# Patient Record
Sex: FEMALE | Race: BLACK OR AFRICAN AMERICAN | NOT HISPANIC OR LATINO | Employment: OTHER | RURAL
[De-identification: names, ages, dates, MRNs, and addresses within clinical notes are randomized per-mention and may not be internally consistent; named-entity substitution may affect disease eponyms.]

---

## 2022-01-14 VITALS — HEIGHT: 65 IN | WEIGHT: 205 LBS | BODY MASS INDEX: 34.16 KG/M2

## 2022-01-14 RX ORDER — ALBUTEROL SULFATE 90 UG/1
2 AEROSOL, METERED RESPIRATORY (INHALATION) EVERY 4 HOURS PRN
COMMUNITY
End: 2023-07-19

## 2022-01-14 RX ORDER — AMLODIPINE BESYLATE 2.5 MG/1
2.5 TABLET ORAL DAILY
COMMUNITY
End: 2023-07-19 | Stop reason: SDUPTHER

## 2022-01-14 RX ORDER — ATORVASTATIN CALCIUM 40 MG/1
40 TABLET, FILM COATED ORAL DAILY
COMMUNITY
End: 2023-07-19 | Stop reason: SDUPTHER

## 2022-01-14 RX ORDER — IBUPROFEN 600 MG/1
600 TABLET ORAL 3 TIMES DAILY PRN
COMMUNITY
End: 2023-07-19 | Stop reason: SDUPTHER

## 2022-01-14 RX ORDER — GABAPENTIN 100 MG/1
100 CAPSULE ORAL 3 TIMES DAILY
COMMUNITY
End: 2023-02-07

## 2022-01-14 RX ORDER — CHLORPHENIRAMINE MALEATE AND PHENYLEPHRINE HYDROCHLORIDE 4; 10 MG/1; MG/1
1 TABLET, COATED ORAL
COMMUNITY
End: 2023-02-07

## 2022-01-14 RX ORDER — BUDESONIDE AND FORMOTEROL FUMARATE DIHYDRATE 80; 4.5 UG/1; UG/1
2 AEROSOL RESPIRATORY (INHALATION)
COMMUNITY
End: 2023-04-20

## 2022-01-14 RX ORDER — CODEINE PHOSPHATE AND GUAIFENESIN 10; 100 MG/5ML; MG/5ML
10 SOLUTION ORAL EVERY 8 HOURS PRN
COMMUNITY
End: 2023-02-07

## 2022-01-14 RX ORDER — AMITRIPTYLINE HYDROCHLORIDE 75 MG/1
150 TABLET ORAL NIGHTLY
COMMUNITY
End: 2023-07-19 | Stop reason: SDUPTHER

## 2022-01-14 RX ORDER — ATENOLOL AND CHLORTHALIDONE TABLET 100; 25 MG/1; MG/1
1 TABLET ORAL DAILY
COMMUNITY
End: 2023-07-19 | Stop reason: SDUPTHER

## 2022-01-14 RX ORDER — FLUTICASONE PROPIONATE 50 MCG
2 SPRAY, SUSPENSION (ML) NASAL DAILY
COMMUNITY
End: 2023-07-19

## 2022-01-14 RX ORDER — DOCUSATE SODIUM 100 MG/1
100 CAPSULE, LIQUID FILLED ORAL 2 TIMES DAILY
COMMUNITY
End: 2023-07-19 | Stop reason: SDUPTHER

## 2022-01-14 RX ORDER — GLIPIZIDE 2.5 MG/1
2.5 TABLET, EXTENDED RELEASE ORAL
COMMUNITY
End: 2023-07-19 | Stop reason: SDUPTHER

## 2022-01-14 RX ORDER — METFORMIN HYDROCHLORIDE 1000 MG/1
1000 TABLET ORAL 2 TIMES DAILY WITH MEALS
COMMUNITY
End: 2023-07-19 | Stop reason: SDUPTHER

## 2022-02-22 ENCOUNTER — OFFICE VISIT (OUTPATIENT)
Dept: PULMONOLOGY | Facility: CLINIC | Age: 62
End: 2022-02-22
Payer: COMMERCIAL

## 2022-02-22 VITALS
HEIGHT: 65 IN | HEART RATE: 86 BPM | BODY MASS INDEX: 34.16 KG/M2 | WEIGHT: 205 LBS | SYSTOLIC BLOOD PRESSURE: 140 MMHG | DIASTOLIC BLOOD PRESSURE: 90 MMHG | OXYGEN SATURATION: 96 %

## 2022-02-22 DIAGNOSIS — R59.0 MEDIASTINAL ADENOPATHY: Primary | ICD-10-CM

## 2022-02-22 DIAGNOSIS — R59.0 MEDIASTINAL ADENOPATHY: ICD-10-CM

## 2022-02-22 DIAGNOSIS — F17.210 CIGARETTE NICOTINE DEPENDENCE WITHOUT COMPLICATION: Chronic | ICD-10-CM

## 2022-02-22 DIAGNOSIS — R05.9 COUGH: Primary | Chronic | ICD-10-CM

## 2022-02-22 DIAGNOSIS — J30.9 ALLERGIC SINUSITIS: Chronic | ICD-10-CM

## 2022-02-22 PROCEDURE — 99204 PR OFFICE/OUTPT VISIT, NEW, LEVL IV, 45-59 MIN: ICD-10-PCS | Mod: S$PBB,,, | Performed by: INTERNAL MEDICINE

## 2022-02-22 PROCEDURE — 3080F DIAST BP >= 90 MM HG: CPT | Mod: CPTII,,, | Performed by: INTERNAL MEDICINE

## 2022-02-22 PROCEDURE — 1159F MED LIST DOCD IN RCRD: CPT | Mod: CPTII,,, | Performed by: INTERNAL MEDICINE

## 2022-02-22 PROCEDURE — 1160F RVW MEDS BY RX/DR IN RCRD: CPT | Mod: CPTII,,, | Performed by: INTERNAL MEDICINE

## 2022-02-22 PROCEDURE — 3008F BODY MASS INDEX DOCD: CPT | Mod: CPTII,,, | Performed by: INTERNAL MEDICINE

## 2022-02-22 PROCEDURE — 99204 OFFICE O/P NEW MOD 45 MIN: CPT | Mod: S$PBB,,, | Performed by: INTERNAL MEDICINE

## 2022-02-22 PROCEDURE — 3077F PR MOST RECENT SYSTOLIC BLOOD PRESSURE >= 140 MM HG: ICD-10-PCS | Mod: CPTII,,, | Performed by: INTERNAL MEDICINE

## 2022-02-22 PROCEDURE — 99215 OFFICE O/P EST HI 40 MIN: CPT | Mod: PBBFAC | Performed by: INTERNAL MEDICINE

## 2022-02-22 PROCEDURE — 3008F PR BODY MASS INDEX (BMI) DOCUMENTED: ICD-10-PCS | Mod: CPTII,,, | Performed by: INTERNAL MEDICINE

## 2022-02-22 PROCEDURE — 3077F SYST BP >= 140 MM HG: CPT | Mod: CPTII,,, | Performed by: INTERNAL MEDICINE

## 2022-02-22 PROCEDURE — 3080F PR MOST RECENT DIASTOLIC BLOOD PRESSURE >= 90 MM HG: ICD-10-PCS | Mod: CPTII,,, | Performed by: INTERNAL MEDICINE

## 2022-02-22 PROCEDURE — 1159F PR MEDICATION LIST DOCUMENTED IN MEDICAL RECORD: ICD-10-PCS | Mod: CPTII,,, | Performed by: INTERNAL MEDICINE

## 2022-02-22 PROCEDURE — 1160F PR REVIEW ALL MEDS BY PRESCRIBER/CLIN PHARMACIST DOCUMENTED: ICD-10-PCS | Mod: CPTII,,, | Performed by: INTERNAL MEDICINE

## 2022-02-22 RX ORDER — MINERAL OIL
180 ENEMA (ML) RECTAL DAILY
Qty: 90 TABLET | Refills: 3 | Status: SHIPPED | OUTPATIENT
Start: 2022-02-22 | End: 2023-02-07

## 2022-02-22 NOTE — H&P (VIEW-ONLY)
Subjective:       Patient ID: Genia Madison is a 61 y.o. female.    Chief Complaint: Referral (LIZET,Cough, and wheezing RF MIKE Vasquez )    61-year-old female history of diabetes, hypertension, hyperlipidemia.  She is referred to me for cough.  She states that due to weather changes she has significant sinus congestion and drainage that exacerbates her cough.  She uses an over-the-counter antihistamine.  She is a current smoker half a pack per day and she has done so since she was a teenager, greater than 30 pack years.  She denies any recent weight loss or night sweats.  Family history significant for lung cancer in her sister and GI cancer in her father.  She denies any symptoms of shortness of breath.  She has Symbicort listed in her medications but states she does not take this regularly.  She has a CT report and images with her which show mediastinal adenopathy specifically, 4 L, 7, and right hilar adenopathy.    Past Medical History:   Diagnosis Date    Abnormal chest CT     1/13/22 [facility]    Cough     bronchitis 11/2021    Diabetes mellitus, type 2     GERD (gastroesophageal reflux disease)     Hyperlipidemia     Hypertension     Hypoxia     O2  2L @ HS    Migraine     LIZET (obstructive sleep apnea)     Seizures     Wheeze      Past Surgical History:   Procedure Laterality Date    BRAIN SURGERY      1/1/2001: [facility]     Family History   Problem Relation Age of Onset    No Known Problems Mother     No Known Problems Father      Review of patient's allergies indicates:   Allergen Reactions    Lisinopril     Pcn [penicillins]       Social History     Tobacco Use    Smoking status: Current Every Day Smoker     Packs/day: 0.50     Years: 30.00     Pack years: 15.00    Smokeless tobacco: Never Used    Tobacco comment: start age 26   Substance Use Topics    Alcohol use: Not Currently    Drug use: Not Currently      Review of Systems   Constitutional: Negative for fever,  chills, activity change and appetite change.   HENT: Positive for postnasal drip, sinus pressure and congestion. Negative for rhinorrhea, sore throat and voice change.    Respiratory: Positive for cough and use of rescue inhaler. Negative for apnea, hemoptysis, sputum production, chest tightness, shortness of breath, wheezing, orthopnea and dyspnea on extertion.    Cardiovascular: Negative for chest pain, palpitations and leg swelling.   Musculoskeletal: Negative for arthralgias, back pain, joint swelling and myalgias.   Skin: Negative for rash.   Gastrointestinal: Negative for nausea, vomiting, abdominal pain and acid reflux.   Neurological: Negative for syncope, weakness, light-headedness and headaches.   Hematological: Negative for adenopathy. Does not bruise/bleed easily and no excessive bruising.   Psychiatric/Behavioral: Negative for confusion and sleep disturbance. The patient is not nervous/anxious.        Objective:      Physical Exam   Constitutional: She is oriented to person, place, and time. She appears well-developed and well-nourished. No distress. She is obese.   HENT:   Head: Normocephalic.   Nose: Nose normal.   Neck: No JVD present. No tracheal deviation present. No thyromegaly present.   Right sided neck scar   Cardiovascular: Normal rate, regular rhythm and normal heart sounds.   Pulmonary/Chest: Effort normal and breath sounds normal. No respiratory distress. She has no wheezes. She has no rhonchi. She has no rales.   Abdominal: Soft. Bowel sounds are normal. There is no abdominal tenderness.   Musculoskeletal:         General: No deformity or edema.      Cervical back: Neck supple.   Lymphadenopathy: No supraclavicular adenopathy is present.     She has no cervical adenopathy.   Neurological: She is alert and oriented to person, place, and time. No cranial nerve deficit.   Skin: Skin is warm and dry. No rash noted.   Psychiatric: She has a normal mood and affect. Her behavior is normal.    Vitals reviewed.    Personal Diagnostic Review  CT of chest: left lower lobe density, mediastinal and right hilar adenopathy    No flowsheet data found.      Assessment:       1. Cough    2. Allergic sinusitis    3. Cigarette nicotine dependence without complication    4. Mediastinal adenopathy        Outpatient Encounter Medications as of 2/22/2022   Medication Sig Dispense Refill    albuterol (PROVENTIL/VENTOLIN HFA) 90 mcg/actuation inhaler Inhale 2 puffs into the lungs every 4 (four) hours as needed for Wheezing. Rescue      amitriptyline (ELAVIL) 75 MG tablet Take 150 mg by mouth every evening. 75 mg: tab 2 at HS (150 mg @ HS)      amLODIPine (NORVASC) 2.5 MG tablet Take 2.5 mg by mouth once daily.      aspirin (ASPIR-81 ORAL) Take 1 tablet by mouth once daily.      atenoloL-chlorthalidone (TENORETIC) 100-25 mg per tablet Take 1 tablet by mouth once daily.      atorvastatin (LIPITOR) 40 MG tablet Take 40 mg by mouth once daily.      blood sugar diagnostic (ONE TOUCH TEST MISC) 1 strip by Misc.(Non-Drug; Combo Route) route Daily. One Touch Ultra : Daily and PRN      budesonide-formoterol 80-4.5 mcg (SYMBICORT) 80-4.5 mcg/actuation HFAA Inhale 2 puffs into the lungs. Controller      chlorpheniramine-phenylephrine (ED A-HIST) 4-10 mg per tablet Take 1 tablet by mouth every 4 to 6 hours as needed for Congestion.      docusate sodium (COLACE) 100 MG capsule Take 100 mg by mouth 2 (two) times daily. Listed as 2 caps daily      esomeprazole magnesium (NEXIUM 24HR ORAL) Take by mouth.      fluticasone propionate (FLONASE) 50 mcg/actuation nasal spray 2 sprays by Each Nostril route once daily.      gabapentin (NEURONTIN) 100 MG capsule Take 100 mg by mouth 3 (three) times daily.      glipiZIDE (GLUCOTROL) 2.5 MG TR24 Take 2.5 mg by mouth daily with breakfast.      guaiFENesin-codeine 100-10 mg/5 ml (TUSSI-ORGANIDIN NR)  mg/5 mL syrup Take 10 mLs by mouth every 8 (eight) hours as needed for Cough.       ibuprofen (ADVIL,MOTRIN) 600 MG tablet Take 600 mg by mouth 3 (three) times daily as needed for Pain.      metFORMIN (GLUCOPHAGE) 1000 MG tablet Take 1,000 mg by mouth 2 (two) times daily with meals.      RELION THIN LANCETS MISC 1 lancet by Misc.(Non-Drug; Combo Route) route once daily. Daily and PRN      fexofenadine (ALLEGRA) 180 MG tablet Take 1 tablet (180 mg total) by mouth once daily. 90 tablet 3     No facility-administered encounter medications on file as of 2/22/2022.     No orders of the defined types were placed in this encounter.      Plan:       Cough  - secondary to post-nasal drip  - recommend daily allegra and flonase    Mediastinal adenopathy  - incidental finding, smoker, + family history of lung cancer  - recommend sampling with EBUS  - LLL abnormality, BAL  - send for flow cytometry

## 2022-02-22 NOTE — PATIENT INSTRUCTIONS
Check in on 1st floor of Ambulatory Care building in the GI/SPECIAL PROCEDURES area.     Pre Procedure Testing  If you have not had Covid Vaccination, you will need to be tested 2-3 days prior to the procedure. If testing is done at facility outside of [facility], please bring the results with you.   If you have not had the Vaccine and need our office to order the test, please contact Dr Peng's nurse in advance so that it can be set up.     Dont eat or drink  Follow any directions you are given for not eating or drinking before surgery. If you don't follow instructions about when to stop eating and drinking, your procedure may be postponed or rescheduled for another day. This is a safety issue.  You can brush your teeth and rinse your mouth, but dont swallow any water.    Day of surgery  Leave jewelry (including rings), watches, and other valuables at home.  Be sure to bring health insurance cards or forms, a photo ID and copy of Covid Vaccine card.  Bring a list of your medicines (include the name, dose, how often you take them, and the time last dose was taken).  You will be sedated for the procedure so you must have a  present with you.  Arrive on time at the hospital or surgery facility.        Flexible Bronchoscopy  A flexible bronchoscopy is an exam of the airways of your lungs. A thin, flexible tube called a bronchoscope is used. It has a light and small camera that allow the healthcare provider to view your airways.    Before your test  Follow your healthcare provider's instructions carefully. If you dont, the exam may be canceled. Or you may need to take it again.  If you are taking blood-thinning medicine, ask your healthcare provider if you should stop taking the medicine before this test.  Have no food or drink for at least 8 hours before the test. Also, avoid smoking for 24 hours before the test.  You will need to remove any dentures or removable devices from your mouth.  Right  before the test, you will be given sedating medicines to help you relax. The medicine may be given by an IV (intravenously) into one of your veins. In addition, your nose and throat may be numbed with a special spray to help prevent gagging and coughing.  If you are having this test as an outpatient, make sure you have an adult friend or family member to drive you home.  During your test  Bronchoscopy takes 45 to 60 minutes and includes the following steps:  You may be given medicine (anesthesia) so that you are unconscious or asleep during the procedure.  The healthcare provider inserts the tube into your nose or mouth.  If you have not been given anesthesia, you might feel a gagging sensation. To help ease this feeling, you will be told to swallow or take deep breaths. Your airway will remain open even with the tube in place. But you wont be able to talk.  The provider checks your breathing passage. He or she may also remove tiny tissue samples for biopsy.  After your test  You may have a mild sore throat or cough. Your voice may also be hoarse.  Don't eat or drink until the anesthesia wears off.  If you had a biopsy, you might see traces of blood being coughed up.  When to call your healthcare provider  Call your healthcare provider right away if you have any of the following:  Shortness of breath  Chest pain  Bleeding from your nose or throat  Coughing up a large amount of blood  A fever above 100.4°F (38°C) for more than 24 hours  Call 911  Call 911 if you have:  Chest pain  Severe shortness of breath   Date Last Reviewed: 12/1/2016 © 2000-2017 [employer]. [address] [place] [address], [place] [address]. All rights reserved. This information is not intended as a substitute for professional medical care. Always follow your healthcare professional's instructions.

## 2022-02-22 NOTE — PROGRESS NOTES
Subjective:       Patient ID: Genia Madison is a 61 y.o. female.    Chief Complaint: Referral (LIZET,Cough, and wheezing RF MIKE Vasquez )    61-year-old female history of diabetes, hypertension, hyperlipidemia.  She is referred to me for cough.  She states that due to weather changes she has significant sinus congestion and drainage that exacerbates her cough.  She uses an over-the-counter antihistamine.  She is a current smoker half a pack per day and she has done so since she was a teenager, greater than 30 pack years.  She denies any recent weight loss or night sweats.  Family history significant for lung cancer in her sister and GI cancer in her father.  She denies any symptoms of shortness of breath.  She has Symbicort listed in her medications but states she does not take this regularly.  She has a CT report and images with her which show mediastinal adenopathy specifically, 4 L, 7, and right hilar adenopathy.    Past Medical History:   Diagnosis Date    Abnormal chest CT     1/13/22 [facility]    Cough     bronchitis 11/2021    Diabetes mellitus, type 2     GERD (gastroesophageal reflux disease)     Hyperlipidemia     Hypertension     Hypoxia     O2  2L @ HS    Migraine     LIZET (obstructive sleep apnea)     Seizures     Wheeze      Past Surgical History:   Procedure Laterality Date    BRAIN SURGERY      1/1/2001: [facility]     Family History   Problem Relation Age of Onset    No Known Problems Mother     No Known Problems Father      Review of patient's allergies indicates:   Allergen Reactions    Lisinopril     Pcn [penicillins]       Social History     Tobacco Use    Smoking status: Current Every Day Smoker     Packs/day: 0.50     Years: 30.00     Pack years: 15.00    Smokeless tobacco: Never Used    Tobacco comment: start age 26   Substance Use Topics    Alcohol use: Not Currently    Drug use: Not Currently      Review of Systems   Constitutional: Negative for fever,  chills, activity change and appetite change.   HENT: Positive for postnasal drip, sinus pressure and congestion. Negative for rhinorrhea, sore throat and voice change.    Respiratory: Positive for cough and use of rescue inhaler. Negative for apnea, hemoptysis, sputum production, chest tightness, shortness of breath, wheezing, orthopnea and dyspnea on extertion.    Cardiovascular: Negative for chest pain, palpitations and leg swelling.   Musculoskeletal: Negative for arthralgias, back pain, joint swelling and myalgias.   Skin: Negative for rash.   Gastrointestinal: Negative for nausea, vomiting, abdominal pain and acid reflux.   Neurological: Negative for syncope, weakness, light-headedness and headaches.   Hematological: Negative for adenopathy. Does not bruise/bleed easily and no excessive bruising.   Psychiatric/Behavioral: Negative for confusion and sleep disturbance. The patient is not nervous/anxious.        Objective:      Physical Exam   Constitutional: She is oriented to person, place, and time. She appears well-developed and well-nourished. No distress. She is obese.   HENT:   Head: Normocephalic.   Nose: Nose normal.   Neck: No JVD present. No tracheal deviation present. No thyromegaly present.   Right sided neck scar   Cardiovascular: Normal rate, regular rhythm and normal heart sounds.   Pulmonary/Chest: Effort normal and breath sounds normal. No respiratory distress. She has no wheezes. She has no rhonchi. She has no rales.   Abdominal: Soft. Bowel sounds are normal. There is no abdominal tenderness.   Musculoskeletal:         General: No deformity or edema.      Cervical back: Neck supple.   Lymphadenopathy: No supraclavicular adenopathy is present.     She has no cervical adenopathy.   Neurological: She is alert and oriented to person, place, and time. No cranial nerve deficit.   Skin: Skin is warm and dry. No rash noted.   Psychiatric: She has a normal mood and affect. Her behavior is normal.    Vitals reviewed.    Personal Diagnostic Review  CT of chest: left lower lobe density, mediastinal and right hilar adenopathy    No flowsheet data found.      Assessment:       1. Cough    2. Allergic sinusitis    3. Cigarette nicotine dependence without complication    4. Mediastinal adenopathy        Outpatient Encounter Medications as of 2/22/2022   Medication Sig Dispense Refill    albuterol (PROVENTIL/VENTOLIN HFA) 90 mcg/actuation inhaler Inhale 2 puffs into the lungs every 4 (four) hours as needed for Wheezing. Rescue      amitriptyline (ELAVIL) 75 MG tablet Take 150 mg by mouth every evening. 75 mg: tab 2 at HS (150 mg @ HS)      amLODIPine (NORVASC) 2.5 MG tablet Take 2.5 mg by mouth once daily.      aspirin (ASPIR-81 ORAL) Take 1 tablet by mouth once daily.      atenoloL-chlorthalidone (TENORETIC) 100-25 mg per tablet Take 1 tablet by mouth once daily.      atorvastatin (LIPITOR) 40 MG tablet Take 40 mg by mouth once daily.      blood sugar diagnostic (ONE TOUCH TEST MISC) 1 strip by Misc.(Non-Drug; Combo Route) route Daily. One Touch Ultra : Daily and PRN      budesonide-formoterol 80-4.5 mcg (SYMBICORT) 80-4.5 mcg/actuation HFAA Inhale 2 puffs into the lungs. Controller      chlorpheniramine-phenylephrine (ED A-HIST) 4-10 mg per tablet Take 1 tablet by mouth every 4 to 6 hours as needed for Congestion.      docusate sodium (COLACE) 100 MG capsule Take 100 mg by mouth 2 (two) times daily. Listed as 2 caps daily      esomeprazole magnesium (NEXIUM 24HR ORAL) Take by mouth.      fluticasone propionate (FLONASE) 50 mcg/actuation nasal spray 2 sprays by Each Nostril route once daily.      gabapentin (NEURONTIN) 100 MG capsule Take 100 mg by mouth 3 (three) times daily.      glipiZIDE (GLUCOTROL) 2.5 MG TR24 Take 2.5 mg by mouth daily with breakfast.      guaiFENesin-codeine 100-10 mg/5 ml (TUSSI-ORGANIDIN NR)  mg/5 mL syrup Take 10 mLs by mouth every 8 (eight) hours as needed for Cough.       ibuprofen (ADVIL,MOTRIN) 600 MG tablet Take 600 mg by mouth 3 (three) times daily as needed for Pain.      metFORMIN (GLUCOPHAGE) 1000 MG tablet Take 1,000 mg by mouth 2 (two) times daily with meals.      RELION THIN LANCETS MISC 1 lancet by Misc.(Non-Drug; Combo Route) route once daily. Daily and PRN      fexofenadine (ALLEGRA) 180 MG tablet Take 1 tablet (180 mg total) by mouth once daily. 90 tablet 3     No facility-administered encounter medications on file as of 2/22/2022.     No orders of the defined types were placed in this encounter.      Plan:       Cough  - secondary to post-nasal drip  - recommend daily allegra and flonase    Mediastinal adenopathy  - incidental finding, smoker, + family history of lung cancer  - recommend sampling with EBUS  - LLL abnormality, BAL  - send for flow cytometry

## 2022-02-25 ENCOUNTER — ANESTHESIA EVENT (OUTPATIENT)
Dept: GASTROENTEROLOGY | Facility: HOSPITAL | Age: 62
End: 2022-02-25
Payer: COMMERCIAL

## 2022-02-25 ENCOUNTER — HOSPITAL ENCOUNTER (OUTPATIENT)
Dept: GASTROENTEROLOGY | Facility: HOSPITAL | Age: 62
Discharge: HOME | End: 2022-02-25
Attending: INTERNAL MEDICINE
Payer: COMMERCIAL

## 2022-02-25 ENCOUNTER — ANESTHESIA (OUTPATIENT)
Dept: GASTROENTEROLOGY | Facility: HOSPITAL | Age: 62
End: 2022-02-25
Payer: COMMERCIAL

## 2022-02-25 VITALS
BODY MASS INDEX: 34.16 KG/M2 | OXYGEN SATURATION: 95 % | WEIGHT: 205 LBS | HEART RATE: 105 BPM | HEIGHT: 65 IN | TEMPERATURE: 98 F | RESPIRATION RATE: 12 BRPM | DIASTOLIC BLOOD PRESSURE: 77 MMHG | SYSTOLIC BLOOD PRESSURE: 117 MMHG

## 2022-02-25 DIAGNOSIS — R59.0 MEDIASTINAL ADENOPATHY: ICD-10-CM

## 2022-02-25 LAB — DIRECT PREP: NORMAL

## 2022-02-25 PROCEDURE — 87206 SMEAR FLUORESCENT/ACID STAI: CPT | Performed by: INTERNAL MEDICINE

## 2022-02-25 PROCEDURE — D9220A PRA ANESTHESIA: ICD-10-PCS | Mod: CRNA,,, | Performed by: NURSE ANESTHETIST, CERTIFIED REGISTERED

## 2022-02-25 PROCEDURE — 31653 PR BRONCH W/ EBUS, SAMPLING 3 OR MORE NODES, INCL GUIDE: ICD-10-PCS | Mod: ,,, | Performed by: INTERNAL MEDICINE

## 2022-02-25 PROCEDURE — 88305 TISSUE EXAM BY PATHOLOGIST: CPT | Mod: MCY | Performed by: INTERNAL MEDICINE

## 2022-02-25 PROCEDURE — C1819 TISSUE LOCALIZATION-EXCISION: HCPCS

## 2022-02-25 PROCEDURE — D9220A PRA ANESTHESIA: Mod: CRNA,,, | Performed by: NURSE ANESTHETIST, CERTIFIED REGISTERED

## 2022-02-25 PROCEDURE — 87015 SPECIMEN INFECT AGNT CONCNTJ: CPT | Performed by: INTERNAL MEDICINE

## 2022-02-25 PROCEDURE — 88185 FLOWCYTOMETRY/TC ADD-ON: CPT

## 2022-02-25 PROCEDURE — 31653 BRONCH EBUS SAMPLNG 3/> NODE: CPT | Mod: ,,, | Performed by: INTERNAL MEDICINE

## 2022-02-25 PROCEDURE — 25000003 PHARM REV CODE 250: Performed by: NURSE ANESTHETIST, CERTIFIED REGISTERED

## 2022-02-25 PROCEDURE — 31624 DX BRONCHOSCOPE/LAVAGE: CPT

## 2022-02-25 PROCEDURE — 88305 TISSUE EXAM BY PATHOLOGIST: CPT | Mod: 26,XU,, | Performed by: PATHOLOGY

## 2022-02-25 PROCEDURE — 27000177 HC AIRWAY, LARYNGEAL MASK: Performed by: ANESTHESIOLOGY

## 2022-02-25 PROCEDURE — 31653 BRONCH EBUS SAMPLNG 3/> NODE: CPT

## 2022-02-25 PROCEDURE — D9220A PRA ANESTHESIA: Mod: ANES,,, | Performed by: ANESTHESIOLOGY

## 2022-02-25 PROCEDURE — 63600175 PHARM REV CODE 636 W HCPCS: Performed by: NURSE ANESTHETIST, CERTIFIED REGISTERED

## 2022-02-25 PROCEDURE — 63600175 PHARM REV CODE 636 W HCPCS: Performed by: ANESTHESIOLOGY

## 2022-02-25 PROCEDURE — 88305 CYTOLOGY, FNA: ICD-10-PCS | Mod: 26,XU,, | Performed by: PATHOLOGY

## 2022-02-25 PROCEDURE — 87210 SMEAR WET MOUNT SALINE/INK: CPT | Performed by: INTERNAL MEDICINE

## 2022-02-25 PROCEDURE — 31652 BRONCH EBUS SAMPLNG 1/2 NODE: CPT

## 2022-02-25 PROCEDURE — 87102 FUNGUS ISOLATION CULTURE: CPT | Performed by: INTERNAL MEDICINE

## 2022-02-25 PROCEDURE — 88108 NON-GYN CYTOLOGY: ICD-10-PCS | Mod: 26,,, | Performed by: PATHOLOGY

## 2022-02-25 PROCEDURE — 37000008 HC ANESTHESIA 1ST 15 MINUTES

## 2022-02-25 PROCEDURE — 31624 PR BRONCHOSCOPY,DIAG2STIC W LAVAGE: ICD-10-PCS | Mod: ,,, | Performed by: INTERNAL MEDICINE

## 2022-02-25 PROCEDURE — 87070 CULTURE OTHR SPECIMN AEROBIC: CPT | Performed by: INTERNAL MEDICINE

## 2022-02-25 PROCEDURE — 88305 TISSUE EXAM BY PATHOLOGIST: CPT | Mod: 26,,, | Performed by: PATHOLOGY

## 2022-02-25 PROCEDURE — 37000009 HC ANESTHESIA EA ADD 15 MINS

## 2022-02-25 PROCEDURE — 88172 CYTP DX EVAL FNA 1ST EA SITE: CPT | Mod: 26,,, | Performed by: PATHOLOGY

## 2022-02-25 PROCEDURE — 27201423 OPTIME MED/SURG SUP & DEVICES STERILE SUPPLY

## 2022-02-25 PROCEDURE — 88172 CYTOLOGY, FNA: ICD-10-PCS | Mod: 26,,, | Performed by: PATHOLOGY

## 2022-02-25 PROCEDURE — 88184 FLOWCYTOMETRY/ TC 1 MARKER: CPT

## 2022-02-25 PROCEDURE — 31624 DX BRONCHOSCOPE/LAVAGE: CPT | Mod: ,,, | Performed by: INTERNAL MEDICINE

## 2022-02-25 PROCEDURE — 88108 CYTOPATH CONCENTRATE TECH: CPT | Mod: 26,,, | Performed by: PATHOLOGY

## 2022-02-25 PROCEDURE — 27000716 HC OXISENSOR PROBE, ANY SIZE: Performed by: ANESTHESIOLOGY

## 2022-02-25 PROCEDURE — D9220A PRA ANESTHESIA: ICD-10-PCS | Mod: ANES,,, | Performed by: ANESTHESIOLOGY

## 2022-02-25 PROCEDURE — 27000510 HC BLANKET BAIR HUGGER ANY SIZE: Performed by: ANESTHESIOLOGY

## 2022-02-25 RX ORDER — MEPERIDINE HYDROCHLORIDE 25 MG/ML
25 INJECTION INTRAMUSCULAR; INTRAVENOUS; SUBCUTANEOUS EVERY 10 MIN PRN
Status: CANCELLED | OUTPATIENT
Start: 2022-02-25 | End: 2022-02-25

## 2022-02-25 RX ORDER — LIDOCAINE HYDROCHLORIDE 10 MG/ML
1 INJECTION, SOLUTION EPIDURAL; INFILTRATION; INTRACAUDAL; PERINEURAL ONCE
Status: DISCONTINUED | OUTPATIENT
Start: 2022-02-25 | End: 2022-02-26 | Stop reason: HOSPADM

## 2022-02-25 RX ORDER — MORPHINE SULFATE 10 MG/ML
4 INJECTION INTRAMUSCULAR; INTRAVENOUS; SUBCUTANEOUS EVERY 5 MIN PRN
Status: CANCELLED | OUTPATIENT
Start: 2022-02-25

## 2022-02-25 RX ORDER — SODIUM CHLORIDE, SODIUM LACTATE, POTASSIUM CHLORIDE, CALCIUM CHLORIDE 600; 310; 30; 20 MG/100ML; MG/100ML; MG/100ML; MG/100ML
INJECTION, SOLUTION INTRAVENOUS CONTINUOUS
Status: DISCONTINUED | OUTPATIENT
Start: 2022-02-25 | End: 2022-02-26 | Stop reason: HOSPADM

## 2022-02-25 RX ORDER — DIPHENHYDRAMINE HYDROCHLORIDE 50 MG/ML
25 INJECTION INTRAMUSCULAR; INTRAVENOUS EVERY 6 HOURS PRN
Status: CANCELLED | OUTPATIENT
Start: 2022-02-25

## 2022-02-25 RX ORDER — FENTANYL CITRATE 50 UG/ML
INJECTION, SOLUTION INTRAMUSCULAR; INTRAVENOUS
Status: DISCONTINUED | OUTPATIENT
Start: 2022-02-25 | End: 2022-02-25

## 2022-02-25 RX ORDER — HYDROMORPHONE HYDROCHLORIDE 2 MG/ML
0.5 INJECTION, SOLUTION INTRAMUSCULAR; INTRAVENOUS; SUBCUTANEOUS EVERY 5 MIN PRN
Status: CANCELLED | OUTPATIENT
Start: 2022-02-25

## 2022-02-25 RX ORDER — ONDANSETRON 2 MG/ML
4 INJECTION INTRAMUSCULAR; INTRAVENOUS DAILY PRN
Status: CANCELLED | OUTPATIENT
Start: 2022-02-25

## 2022-02-25 RX ORDER — PROPOFOL 10 MG/ML
INJECTION, EMULSION INTRAVENOUS
Status: DISCONTINUED | OUTPATIENT
Start: 2022-02-25 | End: 2022-02-25

## 2022-02-25 RX ORDER — LIDOCAINE HYDROCHLORIDE 20 MG/ML
INJECTION, SOLUTION EPIDURAL; INFILTRATION; INTRACAUDAL; PERINEURAL
Status: DISCONTINUED | OUTPATIENT
Start: 2022-02-25 | End: 2022-02-25

## 2022-02-25 RX ADMIN — SODIUM CHLORIDE, POTASSIUM CHLORIDE, SODIUM LACTATE AND CALCIUM CHLORIDE: 600; 310; 30; 20 INJECTION, SOLUTION INTRAVENOUS at 11:02

## 2022-02-25 RX ADMIN — FENTANYL CITRATE 50 MCG: 50 INJECTION INTRAMUSCULAR; INTRAVENOUS at 11:02

## 2022-02-25 RX ADMIN — PROPOFOL 100 MG: 10 INJECTION, EMULSION INTRAVENOUS at 11:02

## 2022-02-25 RX ADMIN — LIDOCAINE HYDROCHLORIDE 75 MG: 20 INJECTION, SOLUTION INTRAVENOUS at 11:02

## 2022-02-25 NOTE — DISCHARGE INSTRUCTIONS
YOU MAY COUGH UP A SMALL AMOUNT OF BLOOD TONIGHT.   Some patients run a fever 24-48 hours after procedure.     Procedure Date  2/25/22     Impression  Overall Impression: Enlarged mediastinal nodes     Recommendation  Follow up with me in clinic  Await pathology results

## 2022-02-25 NOTE — TRANSFER OF CARE
"Anesthesia Transfer of Care Note    Patient: Genia Madison    Procedure(s) Performed: *EBUS  Patient location: PACU    Anesthesia Type: general    Transport from OR: Transported from OR on 6-10 L/min O2 by face mask with adequate spontaneous ventilation    Post pain: adequate analgesia    Post assessment: no apparent anesthetic complications    Post vital signs: stable    Level of consciousness: sedated and awake    Nausea/Vomiting: no nausea/vomiting    Complications: none    Transfer of care protocol was followed      Last vitals:   Visit Vitals  BP (!) 104/50 (BP Location: Right arm, Patient Position: Lying)   Pulse 103   Temp 36.4 °C (97.6 °F)   Resp 16   Ht 5' 5" (1.651 m)   Wt 93 kg (205 lb)   LMP  (LMP Unknown)   SpO2 95%   Breastfeeding No   BMI 34.11 kg/m²     "

## 2022-02-25 NOTE — ANESTHESIA POSTPROCEDURE EVALUATION
Anesthesia Post Evaluation    Patient: Genia Madison    Procedure(s) Performed: * No procedures listed *    Final Anesthesia Type: general      Patient location during evaluation: PACU  Post-procedure vital signs: reviewed and stable  Pain management: adequate  Airway patency: patent  LIZET mitigation strategies: Verification of full reversal of neuromuscular block  PONV status at discharge: No PONV  Anesthetic complications: no      Cardiovascular status: hemodynamically stable  Respiratory status: unassisted  Hydration status: euvolemic  Follow-up not needed.          Vitals Value Taken Time   /77 02/25/22 1428   Temp 36.4 °C (97.6 °F) 02/25/22 1304   Pulse 101 02/25/22 1428   Resp 12 02/25/22 1427   SpO2 95 % 02/25/22 1427   Vitals shown include unvalidated device data.      Event Time   Out of Recovery 13:32:35         Pain/Hardik Score: Hardik Score: 10 (2/25/2022  1:57 PM)

## 2022-02-25 NOTE — INTERVAL H&P NOTE
The patient has been examined and the H&P has been reviewed:    I concur with the findings and no changes have occurred since H&P was written. Lymphadenopathy, possible sarcoid. EBUS today.         There are no hospital problems to display for this patient.

## 2022-02-25 NOTE — ANESTHESIA PREPROCEDURE EVALUATION
02/25/2022  Genia Madison is a 61 y.o., female.      Pre-op Assessment    I have reviewed the Patient Summary Reports.    I have reviewed the NPO Status.   I have reviewed the Medications.     Review of Systems         Anesthesia Plan  Type of Anesthesia, risks & benefits discussed:    Anesthesia Type: Gen Supraglottic Airway  Intra-op Monitoring Plan: Standard ASA Monitors  Post Op Pain Control Plan: IV/PO Opioids PRN  Induction:  IV  Informed Consent: Informed consent signed with the Patient and all parties understand the risks and agree with anesthesia plan.  All questions answered.   ASA Score: 3    Ready For Surgery From Anesthesia Perspective.     .  NPO greater than 8 hours  NAC  Allergic to PCN and lisinopril    Hypertension Diabetes mellitus, type 2   Hyperlipidemia Hypoxia   Cough LIZET (obstructive sleep apnea)   Migraine Wheeze   Seizures GERD (gastroesophageal reflux disease)   Abnormal chest CT    Smoker  H/o clipping of a cerebral aneurysm 22 years ago    Airway exam deferred (COVID precautions); adequate ROM at neck.

## 2022-02-25 NOTE — ANESTHESIA PROCEDURE NOTES
Intubation    Date/Time: 2/25/2022 11:37 AM  Performed by: Garland Wall CRNA  Authorized by: Justin Burdick MD     Intubation:     Induction:  Intravenous    Intubated:  Postinduction    Mask Ventilation:  Easy mask    Attempts:  1    Attempted By:  CRNA    Difficult Airway Encountered?: No      Complications:  None    Airway Device:  Supraglottic airway/LMA    Airway Device Size:  4.0    Style/Cuff Inflation:  Cuffed (inflated to minimal occlusive pressure)    Placement Verified By:  Capnometry    Complicating Factors:  Obesity    Findings Post-Intubation:  BS equal bilateral

## 2022-02-26 LAB — AFB SMEAR (FA): NORMAL

## 2022-02-27 LAB
CULTURE, LOWER RESPIRATORY: NORMAL
GRAM STN SPEC: NORMAL
GRAM STN SPEC: NORMAL

## 2022-02-28 LAB
INSULIN SERPL-ACNC: NORMAL U[IU]/ML
LAB AP COMMENTS: NORMAL
LAB AP LABORATORY NOTES: NORMAL
LAB AP SPECIMEN A NON-GYN GENERAL CATEGORIZATION: NEGATIVE
LAB AP SPECIMEN A NON-GYN INTERPETATION: NORMAL

## 2022-03-01 LAB
ESTROGEN SERPL-MCNC: NORMAL PG/ML
LAB AP COMMENTS: NORMAL
RUSH AP QUICK STAIN DIAGNOSIS: NORMAL
T3RU NFR SERPL: NORMAL %

## 2022-03-16 ENCOUNTER — OFFICE VISIT (OUTPATIENT)
Dept: PULMONOLOGY | Facility: CLINIC | Age: 62
End: 2022-03-16
Payer: COMMERCIAL

## 2022-03-16 VITALS
BODY MASS INDEX: 34.66 KG/M2 | HEIGHT: 65 IN | OXYGEN SATURATION: 97 % | SYSTOLIC BLOOD PRESSURE: 130 MMHG | HEART RATE: 67 BPM | RESPIRATION RATE: 14 BRPM | WEIGHT: 208 LBS | DIASTOLIC BLOOD PRESSURE: 80 MMHG

## 2022-03-16 DIAGNOSIS — R05.9 COUGH: Primary | Chronic | ICD-10-CM

## 2022-03-16 DIAGNOSIS — J30.9 ALLERGIC SINUSITIS: Chronic | ICD-10-CM

## 2022-03-16 DIAGNOSIS — R59.0 MEDIASTINAL ADENOPATHY: ICD-10-CM

## 2022-03-16 DIAGNOSIS — F17.210 CIGARETTE NICOTINE DEPENDENCE WITHOUT COMPLICATION: Chronic | ICD-10-CM

## 2022-03-16 PROCEDURE — 3079F PR MOST RECENT DIASTOLIC BLOOD PRESSURE 80-89 MM HG: ICD-10-PCS | Mod: CPTII,,, | Performed by: INTERNAL MEDICINE

## 2022-03-16 PROCEDURE — 3008F BODY MASS INDEX DOCD: CPT | Mod: CPTII,,, | Performed by: INTERNAL MEDICINE

## 2022-03-16 PROCEDURE — 99213 PR OFFICE/OUTPT VISIT, EST, LEVL III, 20-29 MIN: ICD-10-PCS | Mod: S$PBB,,, | Performed by: INTERNAL MEDICINE

## 2022-03-16 PROCEDURE — 3079F DIAST BP 80-89 MM HG: CPT | Mod: CPTII,,, | Performed by: INTERNAL MEDICINE

## 2022-03-16 PROCEDURE — 1159F MED LIST DOCD IN RCRD: CPT | Mod: CPTII,,, | Performed by: INTERNAL MEDICINE

## 2022-03-16 PROCEDURE — 1160F PR REVIEW ALL MEDS BY PRESCRIBER/CLIN PHARMACIST DOCUMENTED: ICD-10-PCS | Mod: CPTII,,, | Performed by: INTERNAL MEDICINE

## 2022-03-16 PROCEDURE — 1159F PR MEDICATION LIST DOCUMENTED IN MEDICAL RECORD: ICD-10-PCS | Mod: CPTII,,, | Performed by: INTERNAL MEDICINE

## 2022-03-16 PROCEDURE — 3075F PR MOST RECENT SYSTOLIC BLOOD PRESS GE 130-139MM HG: ICD-10-PCS | Mod: CPTII,,, | Performed by: INTERNAL MEDICINE

## 2022-03-16 PROCEDURE — 99215 OFFICE O/P EST HI 40 MIN: CPT | Mod: PBBFAC | Performed by: INTERNAL MEDICINE

## 2022-03-16 PROCEDURE — 3008F PR BODY MASS INDEX (BMI) DOCUMENTED: ICD-10-PCS | Mod: CPTII,,, | Performed by: INTERNAL MEDICINE

## 2022-03-16 PROCEDURE — 99213 OFFICE O/P EST LOW 20 MIN: CPT | Mod: S$PBB,,, | Performed by: INTERNAL MEDICINE

## 2022-03-16 PROCEDURE — 3075F SYST BP GE 130 - 139MM HG: CPT | Mod: CPTII,,, | Performed by: INTERNAL MEDICINE

## 2022-03-16 PROCEDURE — 1160F RVW MEDS BY RX/DR IN RCRD: CPT | Mod: CPTII,,, | Performed by: INTERNAL MEDICINE

## 2022-03-16 NOTE — PROGRESS NOTES
Subjective:       Patient ID: Genia Madison is a 61 y.o. female.    Chief Complaint: Cough (EBUS follow up)    61-year-old female history of diabetes, hypertension, hyperlipidemia.  She is referred to me for cough.  She states that due to weather changes she has significant sinus congestion and drainage that exacerbates her cough.  She uses an over-the-counter antihistamine.  She is a current smoker half a pack per day and she has done so since she was a teenager, greater than 30 pack years.  She denies any recent weight loss or night sweats.  Family history significant for lung cancer in her sister and GI cancer in her father.  She denies any symptoms of shortness of breath.  She has Symbicort listed in her medications but states she does not take this regularly.  She has a CT report and images with her which show mediastinal adenopathy specifically, 4 L, 7, and right hilar adenopathy.  Underwent EBUS with negative results including flow cytometry, no granulomas, cultures negative. Cough improved with measures implemented last visit. She is trying to cut down on her cigarette consumption.     Cough  Associated symptoms include postnasal drip. Pertinent negatives include no chest pain, chills, fever, headaches, hemoptysis, myalgias, rash, rhinorrhea, sore throat, shortness of breath or wheezing.     Review of patient's allergies indicates:   Allergen Reactions    Lisinopril     Pcn [penicillins]       Social History     Tobacco Use    Smoking status: Current Every Day Smoker     Packs/day: 0.50     Years: 30.00     Pack years: 15.00    Smokeless tobacco: Never Used    Tobacco comment: start age 26   Substance Use Topics    Alcohol use: Not Currently    Drug use: Not Currently      Review of Systems   Constitutional: Negative for fever, chills, activity change and appetite change.   HENT: Positive for postnasal drip and congestion. Negative for rhinorrhea, sinus pressure, sore throat and voice change.     Respiratory: Positive for cough and use of rescue inhaler. Negative for apnea, hemoptysis, sputum production, chest tightness, shortness of breath, wheezing, orthopnea and dyspnea on extertion.    Cardiovascular: Negative for chest pain, palpitations and leg swelling.   Musculoskeletal: Negative for arthralgias, back pain, joint swelling and myalgias.   Skin: Negative for rash.   Gastrointestinal: Negative for nausea, vomiting, abdominal pain and acid reflux.   Neurological: Negative for syncope, weakness, light-headedness and headaches.   Hematological: Negative for adenopathy. Does not bruise/bleed easily and no excessive bruising.   Psychiatric/Behavioral: Negative for confusion and sleep disturbance. The patient is not nervous/anxious.        Objective:      Physical Exam   Constitutional: She is oriented to person, place, and time. She appears well-developed and well-nourished. No distress. She is obese.   HENT:   Head: Normocephalic.   Nose: Nose normal.   Neck: No JVD present. No tracheal deviation present. No thyromegaly present.   Right sided neck scar   Cardiovascular: Normal rate, regular rhythm and normal heart sounds.   Pulmonary/Chest: Effort normal and breath sounds normal. No respiratory distress. She has no wheezes. She has no rhonchi. She has no rales.   Abdominal: Soft. Bowel sounds are normal. There is no abdominal tenderness.   Musculoskeletal:         General: No deformity or edema.      Cervical back: Neck supple.   Lymphadenopathy: No supraclavicular adenopathy is present.     She has no cervical adenopathy.   Neurological: She is alert and oriented to person, place, and time. No cranial nerve deficit.   Skin: Skin is warm and dry. No rash noted.   Psychiatric: She has a normal mood and affect. Her behavior is normal.   Vitals reviewed.    Personal Diagnostic Review  CT of chest: left lower lobe density, mediastinal and right hilar adenopathy    No flowsheet data found.      Assessment:        1. Cough    2. Allergic sinusitis    3. Mediastinal adenopathy    4. Cigarette nicotine dependence without complication        Outpatient Encounter Medications as of 3/16/2022   Medication Sig Dispense Refill    albuterol (PROVENTIL/VENTOLIN HFA) 90 mcg/actuation inhaler Inhale 2 puffs into the lungs every 4 (four) hours as needed for Wheezing. Rescue      amitriptyline (ELAVIL) 75 MG tablet Take 150 mg by mouth every evening. 75 mg: tab 2 at HS (150 mg @ HS)      amLODIPine (NORVASC) 2.5 MG tablet Take 2.5 mg by mouth once daily.      aspirin (ASPIR-81 ORAL) Take 1 tablet by mouth once daily.      atenoloL-chlorthalidone (TENORETIC) 100-25 mg per tablet Take 1 tablet by mouth once daily.      atorvastatin (LIPITOR) 40 MG tablet Take 40 mg by mouth once daily.      blood sugar diagnostic (ONE TOUCH TEST MISC) 1 strip by Misc.(Non-Drug; Combo Route) route Daily. One Touch Ultra : Daily and PRN      budesonide-formoterol 80-4.5 mcg (SYMBICORT) 80-4.5 mcg/actuation HFAA Inhale 2 puffs into the lungs. Controller      chlorpheniramine-phenylephrine (ED A-HIST) 4-10 mg per tablet Take 1 tablet by mouth every 4 to 6 hours as needed for Congestion.      docusate sodium (COLACE) 100 MG capsule Take 100 mg by mouth 2 (two) times daily. Listed as 2 caps daily      esomeprazole magnesium (NEXIUM 24HR ORAL) Take by mouth.      fexofenadine (ALLEGRA) 180 MG tablet Take 1 tablet (180 mg total) by mouth once daily. 90 tablet 3    fluticasone propionate (FLONASE) 50 mcg/actuation nasal spray 2 sprays by Each Nostril route once daily.      gabapentin (NEURONTIN) 100 MG capsule Take 100 mg by mouth 3 (three) times daily.      glipiZIDE (GLUCOTROL) 2.5 MG TR24 Take 2.5 mg by mouth daily with breakfast.      guaiFENesin-codeine 100-10 mg/5 ml (TUSSI-ORGANIDIN NR)  mg/5 mL syrup Take 10 mLs by mouth every 8 (eight) hours as needed for Cough.      ibuprofen (ADVIL,MOTRIN) 600 MG tablet Take 600 mg by mouth 3  (three) times daily as needed for Pain.      metFORMIN (GLUCOPHAGE) 1000 MG tablet Take 1,000 mg by mouth 2 (two) times daily with meals.      RELION THIN LANCETS MISC 1 lancet by Misc.(Non-Drug; Combo Route) route once daily. Daily and PRN       No facility-administered encounter medications on file as of 3/16/2022.     No orders of the defined types were placed in this encounter.      Plan:       Cough  - secondary to post-nasal drip  - recommend daily allegra and flonase, improved with these measures    Mediastinal adenopathy  - incidental finding, smoker, + family history of lung cancer  - EBUS + BAL, all results negative  - recommend annual LDCT 1/2023    RTC in 6 months or sooner if cough worsens.

## 2022-03-16 NOTE — PATIENT INSTRUCTIONS
If you would like to quit smoking:  You may be eligible for free services if you are a [place] or [place] resident Call [facility] at [phone].  Contact us via email: [email or web address]  View our website for more information: [email or web address]

## 2022-04-03 LAB
CULTURE, FUNGUS (OTHER): ABNORMAL
CULTURE, FUNGUS (OTHER): ABNORMAL

## 2022-04-19 LAB — MYCOBACTERIUM SPEC CULT: NORMAL

## 2022-05-31 LAB — CRC RECOMMENDATION EXT: NORMAL

## 2023-02-07 ENCOUNTER — OFFICE VISIT (OUTPATIENT)
Dept: FAMILY MEDICINE | Facility: CLINIC | Age: 63
End: 2023-02-07
Payer: MEDICARE

## 2023-02-07 VITALS
WEIGHT: 202.81 LBS | TEMPERATURE: 98 F | HEART RATE: 75 BPM | BODY MASS INDEX: 33.79 KG/M2 | SYSTOLIC BLOOD PRESSURE: 130 MMHG | DIASTOLIC BLOOD PRESSURE: 84 MMHG | OXYGEN SATURATION: 87 % | RESPIRATION RATE: 18 BRPM | HEIGHT: 65 IN

## 2023-02-07 DIAGNOSIS — Z12.31 SCREENING MAMMOGRAM, ENCOUNTER FOR: Primary | ICD-10-CM

## 2023-02-07 DIAGNOSIS — J44.9 CHRONIC OBSTRUCTIVE PULMONARY DISEASE, UNSPECIFIED COPD TYPE: ICD-10-CM

## 2023-02-07 DIAGNOSIS — Z99.81 OXYGEN DEPENDENT: ICD-10-CM

## 2023-02-07 PROBLEM — Z86.0100 HISTORY OF COLONIC POLYPS: Status: ACTIVE | Noted: 2023-02-07

## 2023-02-07 PROBLEM — Z86.010 HISTORY OF COLONIC POLYPS: Status: ACTIVE | Noted: 2023-02-07

## 2023-02-07 PROBLEM — E11.8 MULTIPLE COMPLICATIONS OF TYPE 2 DIABETES MELLITUS: Status: ACTIVE | Noted: 2022-01-17

## 2023-02-07 PROBLEM — E55.9 VITAMIN D DEFICIENCY: Status: ACTIVE | Noted: 2017-10-29

## 2023-02-07 PROBLEM — K63.5 COLON POLYPS: Status: ACTIVE | Noted: 2023-02-07

## 2023-02-07 PROBLEM — N39.0 CHRONIC UTI (URINARY TRACT INFECTION): Status: ACTIVE | Noted: 2019-10-29

## 2023-02-07 PROBLEM — R20.0 NUMBNESS OF ANTERIOR THIGH: Status: ACTIVE | Noted: 2022-12-15

## 2023-02-07 PROBLEM — R79.89 ABNORMAL LFTS: Status: ACTIVE | Noted: 2017-04-24

## 2023-02-07 PROBLEM — R06.89 DECREASED BREATH SOUNDS: Status: ACTIVE | Noted: 2017-04-24

## 2023-02-07 PROBLEM — E66.9 OBESITY: Status: ACTIVE | Noted: 2021-04-20

## 2023-02-07 PROBLEM — R23.4: Status: ACTIVE | Noted: 2021-07-22

## 2023-02-07 PROBLEM — R19.5 HARD STOOL: Status: ACTIVE | Noted: 2022-11-23

## 2023-02-07 PROBLEM — H53.8 BLURRY VISION: Status: ACTIVE | Noted: 2017-10-29

## 2023-02-07 PROBLEM — K62.5 RECTAL BLEED: Status: ACTIVE | Noted: 2023-02-07

## 2023-02-07 PROBLEM — G62.9 NEUROPATHY: Status: ACTIVE | Noted: 2018-09-24

## 2023-02-07 PROBLEM — R42 DIZZINESS: Status: ACTIVE | Noted: 2017-10-29

## 2023-02-07 PROBLEM — M35.9 AUTOIMMUNE DISEASE: Status: ACTIVE | Noted: 2018-04-12

## 2023-02-07 PROBLEM — Z28.20 IMMUNIZATION NOT CARRIED OUT BECAUSE OF PATIENT DECISION: Status: ACTIVE | Noted: 2022-11-23

## 2023-02-07 PROBLEM — I99.8 ISCHEMIA: Status: ACTIVE | Noted: 2018-09-24

## 2023-02-07 PROBLEM — K21.9 GASTROESOPHAGEAL REFLUX DISEASE WITHOUT ESOPHAGITIS: Status: ACTIVE | Noted: 2022-11-23

## 2023-02-07 PROBLEM — E11.42 DIABETIC PERIPHERAL NEUROPATHY ASSOCIATED WITH TYPE 2 DIABETES MELLITUS: Status: ACTIVE | Noted: 2022-11-23

## 2023-02-07 PROBLEM — R52 BURNING PAIN: Status: ACTIVE | Noted: 2018-09-24

## 2023-02-07 PROBLEM — E11.9 TYPE 2 DIABETES MELLITUS WITHOUT COMPLICATION, WITHOUT LONG-TERM CURRENT USE OF INSULIN: Status: ACTIVE | Noted: 2017-06-07

## 2023-02-07 PROBLEM — E53.8 B12 DEFICIENCY: Status: ACTIVE | Noted: 2017-10-29

## 2023-02-07 PROBLEM — E78.5 HYPERLIPIDEMIA: Status: ACTIVE | Noted: 2021-04-20

## 2023-02-07 PROBLEM — D64.9 ANEMIA: Status: ACTIVE | Noted: 2022-11-23

## 2023-02-07 PROCEDURE — 3008F BODY MASS INDEX DOCD: CPT | Mod: ,,, | Performed by: FAMILY MEDICINE

## 2023-02-07 PROCEDURE — 3008F PR BODY MASS INDEX (BMI) DOCUMENTED: ICD-10-PCS | Mod: ,,, | Performed by: FAMILY MEDICINE

## 2023-02-07 PROCEDURE — 99202 PR OFFICE/OUTPT VISIT, NEW, LEVL II, 15-29 MIN: ICD-10-PCS | Mod: ,,, | Performed by: FAMILY MEDICINE

## 2023-02-07 PROCEDURE — 99202 OFFICE O/P NEW SF 15 MIN: CPT | Mod: ,,, | Performed by: FAMILY MEDICINE

## 2023-02-07 PROCEDURE — 3075F SYST BP GE 130 - 139MM HG: CPT | Mod: ,,, | Performed by: FAMILY MEDICINE

## 2023-02-07 PROCEDURE — 3075F PR MOST RECENT SYSTOLIC BLOOD PRESS GE 130-139MM HG: ICD-10-PCS | Mod: ,,, | Performed by: FAMILY MEDICINE

## 2023-02-07 PROCEDURE — 3079F DIAST BP 80-89 MM HG: CPT | Mod: ,,, | Performed by: FAMILY MEDICINE

## 2023-02-07 PROCEDURE — 1159F PR MEDICATION LIST DOCUMENTED IN MEDICAL RECORD: ICD-10-PCS | Mod: ,,, | Performed by: FAMILY MEDICINE

## 2023-02-07 PROCEDURE — 1159F MED LIST DOCD IN RCRD: CPT | Mod: ,,, | Performed by: FAMILY MEDICINE

## 2023-02-07 PROCEDURE — 3079F PR MOST RECENT DIASTOLIC BLOOD PRESSURE 80-89 MM HG: ICD-10-PCS | Mod: ,,, | Performed by: FAMILY MEDICINE

## 2023-02-07 RX ORDER — FERROUS SULFATE 325(65) MG
TABLET ORAL EVERY OTHER DAY
COMMUNITY
End: 2023-02-07

## 2023-02-07 RX ORDER — OMEPRAZOLE 40 MG/1
40 CAPSULE, DELAYED RELEASE ORAL EVERY MORNING
COMMUNITY
Start: 2023-01-13 | End: 2023-07-19 | Stop reason: SDUPTHER

## 2023-02-07 RX ORDER — GABAPENTIN 300 MG/1
1 CAPSULE ORAL 2 TIMES DAILY
COMMUNITY
Start: 2023-01-13 | End: 2023-02-07

## 2023-02-07 NOTE — PROGRESS NOTES
Shaina Rey MD        PATIENT NAME: Genia Madison  : 1960  DATE: 23  MRN: [id number]      Billing Provider: Shaina Rey MD  Level of Service:   Patient PCP Information       Provider PCP Type    Shaina Rey MD General            Reason for Visit / Chief Complaint: Establish Care (Here to establish care because she needs a physician to order her oxygen through [employer] in [place] [phone]. She wears it at night. She has been to sleep clinic but has not been there in a while. )       History of Present Illness      Genia Madison presents to the clinic with Establish Care (Here to establish care because she needs a physician to order her oxygen through [employer] in [place] [phone]. She wears it at night. She has been to sleep clinic but has not been there in a while. )     She is here because she is a smoker, has COPD has has been having desaturations, needs to sleep with oxygen and her CPAP has sleep apnea, she was going to go see a NP, but she is unable to order equipment for her  She is currently doing ok, she is going to call the quitting line to see if she can get some patches so she can stop smoking       Review of Systems     Review of Systems   Constitutional:  Negative for activity change, appetite change, fatigue and fever.   Respiratory:  Positive for cough and shortness of breath.    Musculoskeletal:  Positive for arthralgias.   Allergic/Immunologic: Positive for environmental allergies.   Psychiatric/Behavioral:  Negative for agitation, behavioral problems and suicidal ideas.      Medical / Social / Family History     Past Medical History:   Diagnosis Date    Abnormal chest CT     22 [facility]    Cough     bronchitis 2021    Diabetes mellitus, type 2     GERD (gastroesophageal reflux disease)     Hyperlipidemia     Hypertension     Hypoxia     O2  2L @ HS    Migraine     LIZET (obstructive sleep apnea)     Seizures     Wheeze        Past Surgical History:    Procedure Laterality Date    BRAIN SURGERY      1/1/2001: [facility]       Social History  Ms.  reports that she has been smoking cigarettes. She has a 15.00 pack-year smoking history. She has been exposed to tobacco smoke. She has never used smokeless tobacco. She reports that she does not currently use alcohol. She reports that she does not currently use drugs.    Family History  Ms.'s family history includes Alzheimer's disease in her brother and mother; Diabetes in her brother; Heart attack in her brother; Stomach cancer in her father.    Medications and Allergies     Medications  Outpatient Medications Marked as Taking for the 2/7/23 encounter (Office Visit) with Shaina Rey MD   Medication Sig Dispense Refill    albuterol (PROVENTIL/VENTOLIN HFA) 90 mcg/actuation inhaler Inhale 2 puffs into the lungs every 4 (four) hours as needed for Wheezing. Rescue      amitriptyline (ELAVIL) 75 MG tablet Take 150 mg by mouth every evening. 75 mg: tab 2 at HS (150 mg @ HS)      amLODIPine (NORVASC) 2.5 MG tablet Take 2.5 mg by mouth once daily.      aspirin (ASPIR-81 ORAL) Take 1 tablet by mouth once daily.      atenoloL-chlorthalidone (TENORETIC) 100-25 mg per tablet Take 1 tablet by mouth once daily.      atorvastatin (LIPITOR) 40 MG tablet Take 40 mg by mouth once daily.      blood sugar diagnostic (ACCU-CHEK GUIDE TEST STRIPS [facility]) Accu-Chek Guide test strips   Use as directed to check glucose daily.  E11.59      budesonide-formoterol 80-4.5 mcg (SYMBICORT) 80-4.5 mcg/actuation HFAA Inhale 2 puffs into the lungs. Controller      docusate sodium (COLACE) 100 MG capsule Take 100 mg by mouth 2 (two) times daily. Listed as 2 caps daily      glipiZIDE (GLUCOTROL) 2.5 MG TR24 Take 2.5 mg by mouth daily with breakfast.      ibuprofen (ADVIL,MOTRIN) 600 MG tablet Take 600 mg by mouth 3 (three) times daily as needed for Pain.      metFORMIN (GLUCOPHAGE) 1000 MG tablet Take 1,000 mg by mouth 2 (two) times daily with meals.       "omeprazole (PRILOSEC) 40 MG capsule Take 40 mg by mouth.      RELION THIN LANCETS MISC 1 lancet by Misc.(Non-Drug; Combo Route) route once daily. Daily and PRN      [DISCONTINUED] ferrous sulfate (FEOSOL) 325 mg (65 mg iron) Tab tablet Take by mouth every other day.      [DISCONTINUED] gabapentin (NEURONTIN) 300 MG capsule Take 1 capsule by mouth 2 (two) times a day.         Allergies  Review of patient's allergies indicates:   Allergen Reactions    Lisinopril     Pcn [penicillins]        Physical Examination   /84 (BP Location: Left arm, Patient Position: Sitting)   Pulse 75   Temp 98 °F (36.7 °C) (Oral)   Resp 18   Ht 5' 4.5" (1.638 m)   Wt 92 kg (202 lb 12.8 oz)   LMP  (LMP Unknown)   SpO2 (!) 87%   BMI 34.27 kg/m²     Physical Exam  Constitutional:       Appearance: Normal appearance. She is normal weight.   Cardiovascular:      Rate and Rhythm: Normal rate and regular rhythm.      Pulses: Normal pulses.      Heart sounds: Normal heart sounds.   Pulmonary:      Breath sounds: Wheezing and rales present.   Musculoskeletal:         General: Normal range of motion.   Skin:     General: Skin is warm.   Neurological:      General: No focal deficit present.      Mental Status: She is alert.   Psychiatric:         Mood and Affect: Mood normal.         Behavior: Behavior normal.         Judgment: Judgment normal.         Assessment and Plan (including Health Maintenance)     Plan:         Problem List Items Addressed This Visit          Pulmonary    Oxygen dependent    Relevant Orders    OXYGEN FOR HOME USE     Other Visit Diagnoses       Screening mammogram, encounter for    -  Primary    Relevant Orders    Mammo Digital Screening Bilat    Chronic obstructive pulmonary disease, unspecified COPD type            - desaturation noted today as per above, will order oxygen, use as needed  - discussed symbicort and why to use it, she is not using it daily, she is using it as needed, also discussed all her meds " and cleaned them up      Follow up in about 6 months (around 8/7/2023).        Signature:  Shaina Rey MD      Date of encounter: 2/7/23

## 2023-02-09 DIAGNOSIS — Z71.89 COMPLEX CARE COORDINATION: ICD-10-CM

## 2023-02-22 LAB — BCS RECOMMENDATION EXT: NORMAL

## 2023-03-27 NOTE — PROGRESS NOTES
[facility] AWV   [facility]     PATIENT NAME: Genia Madison   : 1960    AGE: 62 y.o. DATE: 2023   MRN: [id number]        Reason for Visit / Chief Complaint: Medicare AWV (Medicare Initial AWV )        Genia Madison presents for an Initial Medicare AWV today. She is interested in smoking cessation and LDCT     The following components were reviewed and updated:    Medical/Social/Family History:  Past Medical History:   Diagnosis Date    Abnormal chest CT     22 [facility]    Cough     bronchitis 2021    Diabetes mellitus, type 2     GERD (gastroesophageal reflux disease)     Hyperlipidemia     Hypertension     Hypoxia     O2  2L @ HS    Migraine     LIZET (obstructive sleep apnea)     Seizures     Wheeze         Family History   Problem Relation Age of Onset    Alzheimer's disease Mother     Stomach cancer Father     Diabetes Brother     Heart attack Brother     Alzheimer's disease Brother         Social History     Tobacco Use   Smoking Status Every Day    Packs/day: 0.50    Years: 45.00    Pack years: 22.50    Types: Cigarettes    Start date:     Passive exposure: Past   Smokeless Tobacco Never   Tobacco Comments    start age 26      Social History     Substance and Sexual Activity   Alcohol Use Not Currently       Family History   Problem Relation Age of Onset    Alzheimer's disease Mother     Stomach cancer Father     Diabetes Brother     Heart attack Brother     Alzheimer's disease Brother        Past Surgical History:   Procedure Laterality Date    BRAIN SURGERY      2001: [facility]         Allergies and Current Medications     Review of patient's allergies indicates:   Allergen Reactions    Lisinopril     Pcn [penicillins]        Current Outpatient Medications:     amitriptyline (ELAVIL) 75 MG tablet, Take 150 mg by mouth every evening. 75 mg: tab 2 at HS (150 mg @ HS), Disp: , Rfl:     amLODIPine (NORVASC) 2.5 MG tablet, Take 2.5 mg by mouth once  daily., Disp: , Rfl:     aspirin (ASPIR-81 ORAL), Take 1 tablet by mouth once daily., Disp: , Rfl:     atenoloL-chlorthalidone (TENORETIC) 100-25 mg per tablet, Take 1 tablet by mouth once daily., Disp: , Rfl:     atorvastatin (LIPITOR) 40 MG tablet, Take 40 mg by mouth once daily., Disp: , Rfl:     docusate sodium (COLACE) 100 MG capsule, Take 100 mg by mouth 2 (two) times daily. Listed as 2 caps daily, Disp: , Rfl:     gabapentin (NEURONTIN) 300 MG capsule, gabapentin 300 mg capsule  Take 1 capsule twice a day by oral route., Disp: , Rfl:     glipiZIDE (GLUCOTROL) 2.5 MG TR24, Take 2.5 mg by mouth daily with breakfast., Disp: , Rfl:     ibuprofen (ADVIL,MOTRIN) 600 MG tablet, Take 600 mg by mouth 3 (three) times daily as needed for Pain., Disp: , Rfl:     metFORMIN (GLUCOPHAGE) 1000 MG tablet, Take 1,000 mg by mouth 2 (two) times daily with meals., Disp: , Rfl:     omeprazole (PRILOSEC) 40 MG capsule, Take 40 mg by mouth., Disp: , Rfl:     ACCU-CHEK GUIDE TEST STRIPS Strp, , Disp: , Rfl:     albuterol (PROVENTIL/VENTOLIN HFA) 90 mcg/actuation inhaler, Inhale 2 puffs into the lungs every 4 (four) hours as needed for Wheezing. Rescue, Disp: , Rfl:     blood sugar diagnostic (ACCU-CHEK GUIDE TEST STRIPS [facility] Accu-Chek Guide test strips  Use as directed to check glucose daily.  E11.59, Disp: , Rfl:     blood sugar diagnostic (ONE TOUCH TEST MISC), 1 strip by Misc.(Non-Drug; Combo Route) route Daily. One Touch Ultra : Daily and PRN, Disp: , Rfl:     budesonide-formoterol 80-4.5 mcg (SYMBICORT) 80-4.5 mcg/actuation HFAA, Inhale 2 puffs into the lungs. Controller, Disp: , Rfl:     fluticasone propionate (FLONASE) 50 mcg/actuation nasal spray, 2 sprays by Each Nostril route once daily., Disp: , Rfl:     RELION THIN LANCETS MISC, 1 lancet by Misc.(Non-Drug; Combo Route) route once daily. Daily and PRN, Disp: , Rfl:     Health Risk Assessment   Fall Risk: No   Obesity: BMI 33.64     Advance Directive:  Does not have an  advanced directive. Verbal education and written education included in today's AVS.   Depression: PHQ9  0   HTN: yes, DASH diet, exercise, weight management, med compliance, home BP monitoring, and follow-up discussed.   T2DM: yes, diabetic diet, glucose monitoring, activity level, weight management, med compliance, and follow-up discussed.   Tobacco use: Current Smoker. Pt instructed on smoking cessation and written and verbal education given today at [facility]. Pt request referral to [place] Quitline and ready to quit.   STI: NA    Alcohol misuse: Denies   Statin Use: Yes      Health Risk Assessment  What is your age?:  (62)  Are you male or female?: Female  During the past four weeks, how much have you been bothered by emotional problems such as feeling anxious, depressed, irritable, sad, or downhearted and blue?: Slightly  During the past five weeks, has your physical and/or emotional health limited your social activities with family, friends, neighbors, or groups?: Not at all  During the past four weeks, how much bodily pain have you generally had?: Mild pain  During the past four weeks, was someone available to help if you needed and wanted help?: Yes, as much as I wanted  During the past four weeks, what was the hardest physical activity you could do for at least two minutes?: Light  Can you get to places out of walking distance without help?  (For example, can you travel alone on buses or taxis, or drive your own car?): Yes  Can you go shopping for groceries or clothes without someone's help?: Yes  Can you prepare your own meals?: Yes  Can you do your own housework without help?: Yes  Because of any health problems, do you need the help of another person with your personal care needs such as eating, bathing, dressing, or getting around the house?: No  Can you handle your own money without help?: Yes  During the past four weeks, how would you rate your health in general?: Good  How have things been going for  you during the past four weeks?: Very well  Are you having difficulties driving your car?: No  Do you always fasten your seat belt when you are in a car?: Yes, usually  How often in the past four weeks have you been bothered by falling or dizzy when standing up?: Never  How often in the past four weeks have you been bothered by sexual problems?: Never  How often in the past four weeks have you been bothered by trouble eating well?: Never  How often in the past four weeks have you been bothered by teeth or denture problems?: Never  How often in the past four weeks have you been bothered with problems using the telephone?: Never  How often in the past four weeks have you been bothered by tiredness or fatigue?: Sometimes  Have you fallen two or more times in the past year?: No  Are you afraid of falling?: No  Are you a smoker?: Yes, I'm not ready to quit  During the past four weeks, how many drinks of wine, beer, or other alcoholic beverages did you have?: No alcohol at all  Do you exercise for about 20 minutes three or more days a week?: Yes, some of the time  Have you been given any information to help you with hazards in your house that might hurt you?: No  Have you been given any information to help you with keeping track of your medications?: No  How often do you have trouble taking medicines the way you've been told to take them?: I always take them as prescribed  How confident are you that you can control and manage most of your health problems?: Very confident  What is your race? (Check all that apply.):     Health Maintenance   Last eye exam: 2022 will request records   Last CV screen with lipids: 12/15/2022   Diabetes screening with fasting glucose or A1c: 12/15/2022   Colonoscopy: 05/31/2022 at [facility]. Will request records   Flu Vaccine: Declined   Pneumonia vaccines: Declined   COVID vaccine: Declined   Hep B vaccine: NA   DEXA: NA   Last pap/pelvic: Pt reports due this year and has done at  "[facility] and will make own appt.   Last Mammogram:  at [facility]. Will request records.   Last PSA screen: NA   AAA screening: NA (once in lifetime for males 65-75 who have smoked > 100 cigarettes in lifetime)  HIV Screein2018  Hepatitis C Screen: 2018  Low Dose CT Scan: 22.5 pack years. Pt interested in smoking cessation and counseling visit. Will schedule today at [facility] for follow up for LDCT    Health Maintenance Topics with due status: Not Due       Topic Last Completion Date    Lipid Panel 12/15/2022    Hemoglobin A1c 12/15/2022     Health Maintenance Due   Topic Date Due    Cervical Cancer Screening  Never done    Diabetes Urine Screening  Never done    Foot Exam  Never done    Mammogram  Never done    Colorectal Cancer Screening  2020    LDCT Lung Screen  2023    Eye Exam  2023       Incontinence  Bowel: No  Bladder: No    Lab results available in Epic or see dates from [facility] above:   No results found for: CHOL  No results found for: HDL  No results found for: LDLCALC  No results found for: TRIG  No results found for: CHOLHDL    No results found for: LABA1C, HGBA1C    No results found for: NA, K, CL, CO2, GLU, BUN, CREATININE, CALCIUM, PROT, ALBUMIN, BILITOT, ALKPHOS, AST, ALT, ANIONGAP, ESTGFRAFRICA, EGFRNONAA              Care Team   PCP: Shaina Rey    Eye specialist: Dr. Johnson    Pt has appt with Rosalinda Constantino on 08/10/2023       **See Completed Assessments for Annual Wellness visit within the encounter summary    The following assessments were completed & reviewed:  Depression Screening  Cognitive function Screening  Timed Get Up Test  Whisper Test  Vision Screen  Health Risk Assessment  Checklist of ADLs and IADLs      Objective  Vitals:    23 1505   BP: 130/74   Pulse: 74   Resp: (!) 22   Temp: 98.6 °F (37 °C)   SpO2: (!) 94%   Weight: 90.3 kg (199 lb 0.6 oz)   Height: 5' 4.5" (1.638 m)   PainSc: 0-No pain      Body mass index is 33.64 kg/m².  Ideal " body weight: 55.8 kg (123 lb 2 oz)       Physical Exam  Vitals and nursing note reviewed.   HENT:      Right Ear: External ear normal.      Left Ear: External ear normal.      Nose: Nose normal.      Mouth/Throat:      Mouth: Mucous membranes are moist.   Eyes:      Conjunctiva/sclera: Conjunctivae normal.      Pupils: Pupils are equal, round, and reactive to light.   Cardiovascular:      Rate and Rhythm: Normal rate and regular rhythm.   Pulmonary:      Effort: Pulmonary effort is normal.      Breath sounds: Wheezing present.   Musculoskeletal:      Cervical back: Normal range of motion and neck supple.   Skin:     Capillary Refill: Capillary refill takes less than 2 seconds.   Neurological:      Mental Status: She is alert and oriented to person, place, and time.         Assessment:     1. Encounter for initial annual wellness visit (AWV) in Medicare patient    2. BMI 33.0-33.9,adult    3. Oxygen dependent    4. Essential hypertension    5. Cigarette nicotine dependence without complication       Problem List Items Addressed This Visit          Pulmonary    Oxygen dependent       Cardiac/Vascular    Essential hypertension    Overview     Last Assessment & Plan:   Formatting of this note might be different from the original.  Discussed importance of monitoring BP  Advised to purchase a BP machine    Blood pressure checks - daily preferred  - The top number should be LESS than 140, but GREATER than 90  - The bottom number should be LESS than 90, but GREATER than 60  - so that is LESS than = 140/90 and GREATER than 90/60    Pulse should be LESS than 100, GREATER than 60            Other    Cigarette nicotine dependence without complication (Chronic)    Current Assessment & Plan     Assistance with smoking cessation was offered, including:  [x]  Medications  [x]  Counseling  [x]  Printed Information on Smoking Cessation  [x]  Referral to a Smoking Cessation Program    Patient was counseled regarding smoking for 3-10  minutes.            Other Visit Diagnoses       Encounter for initial annual wellness visit (AWV) in Medicare patient    -  Primary    BMI 33.0-33.9,adult                     Plan:  Encounter for initial annual wellness visit (AWV) in Medicare patient    BMI 33.0-33.9,adult    Oxygen dependent    Essential hypertension    Cigarette nicotine dependence without complication          Referrals:     Advised to call office if does not hear from anyone with referral appt within 2-3 weeks to check on status of referral. Voiced understanding.        Discussed and provided with a screening schedule and personal prevention plan in accordance with USPSTF age appropriate recommendations and Medicare screening guidelines.   Education, counseling, and referrals were provided as needed.  After Visit Summary printed and given to patient which includes written education and a list of any referrals if indicated.     F/u plan for yearly AWV.    Signature: MIKE Pedraza

## 2023-03-28 ENCOUNTER — OFFICE VISIT (OUTPATIENT)
Dept: FAMILY MEDICINE | Facility: CLINIC | Age: 63
End: 2023-03-28
Payer: MEDICARE

## 2023-03-28 VITALS
OXYGEN SATURATION: 94 % | HEART RATE: 74 BPM | RESPIRATION RATE: 22 BRPM | WEIGHT: 199.06 LBS | SYSTOLIC BLOOD PRESSURE: 130 MMHG | HEIGHT: 65 IN | DIASTOLIC BLOOD PRESSURE: 74 MMHG | BODY MASS INDEX: 33.16 KG/M2 | TEMPERATURE: 99 F

## 2023-03-28 DIAGNOSIS — F17.210 CIGARETTE NICOTINE DEPENDENCE WITHOUT COMPLICATION: Chronic | ICD-10-CM

## 2023-03-28 DIAGNOSIS — Z00.00 ENCOUNTER FOR INITIAL ANNUAL WELLNESS VISIT (AWV) IN MEDICARE PATIENT: Primary | ICD-10-CM

## 2023-03-28 DIAGNOSIS — I10 ESSENTIAL HYPERTENSION: ICD-10-CM

## 2023-03-28 DIAGNOSIS — Z99.81 OXYGEN DEPENDENT: ICD-10-CM

## 2023-03-28 PROCEDURE — 1159F PR MEDICATION LIST DOCUMENTED IN MEDICAL RECORD: ICD-10-PCS | Mod: ,,, | Performed by: NURSE PRACTITIONER

## 2023-03-28 PROCEDURE — 3078F PR MOST RECENT DIASTOLIC BLOOD PRESSURE < 80 MM HG: ICD-10-PCS | Mod: ,,, | Performed by: NURSE PRACTITIONER

## 2023-03-28 PROCEDURE — G0438 PR WELCOME MEDICARE ANNUAL WELLNESS INITIAL VISIT: ICD-10-PCS | Mod: ,,, | Performed by: NURSE PRACTITIONER

## 2023-03-28 PROCEDURE — 3078F DIAST BP <80 MM HG: CPT | Mod: ,,, | Performed by: NURSE PRACTITIONER

## 2023-03-28 PROCEDURE — 3008F BODY MASS INDEX DOCD: CPT | Mod: ,,, | Performed by: NURSE PRACTITIONER

## 2023-03-28 PROCEDURE — 3075F PR MOST RECENT SYSTOLIC BLOOD PRESS GE 130-139MM HG: ICD-10-PCS | Mod: ,,, | Performed by: NURSE PRACTITIONER

## 2023-03-28 PROCEDURE — 1159F MED LIST DOCD IN RCRD: CPT | Mod: ,,, | Performed by: NURSE PRACTITIONER

## 2023-03-28 PROCEDURE — 3075F SYST BP GE 130 - 139MM HG: CPT | Mod: ,,, | Performed by: NURSE PRACTITIONER

## 2023-03-28 PROCEDURE — 3008F PR BODY MASS INDEX (BMI) DOCUMENTED: ICD-10-PCS | Mod: ,,, | Performed by: NURSE PRACTITIONER

## 2023-03-28 PROCEDURE — G0438 PPPS, INITIAL VISIT: HCPCS | Mod: ,,, | Performed by: NURSE PRACTITIONER

## 2023-03-28 RX ORDER — GABAPENTIN 300 MG/1
CAPSULE ORAL
COMMUNITY
End: 2023-04-20

## 2023-03-28 RX ORDER — BLOOD SUGAR DIAGNOSTIC
STRIP MISCELLANEOUS
COMMUNITY
Start: 2023-02-14 | End: 2023-10-24 | Stop reason: SDUPTHER

## 2023-03-28 NOTE — PATIENT INSTRUCTIONS
Counseling and Referral of Other Preventative  (Italic type indicates deductible and co-insurance are waived)    Patient Name: Genia Madison  Today's Date: 3/28/2023    Health Maintenance         Date Due Completion Date    Cervical Cancer Screening Never done ---    Diabetes Urine Screening Never done ---    Foot Exam Never done ---    Mammogram Never done ---    Colorectal Cancer Screening 04/13/2020 4/13/2015    Eye Exam 03/22/2023 6/10/2021    TETANUS VACCINE 02/07/2024 (Originally 11/8/1979) ---    Shingles Vaccine (1 of 2) 02/07/2024 (Originally 11/8/2011) ---    COVID-19 Vaccine (1) 02/07/2024 (Originally [birth year]/1962) ---    Pneumococcal Vaccines (Age 0-64) (1 - PCV) 02/07/2024 (Originally 11/8/1967) ---    Hemoglobin A1c 06/15/2023 12/15/2022    Lipid Panel 12/15/2023 12/15/2022          No orders of the defined types were placed in this encounter.

## 2023-03-29 PROBLEM — R42 DIZZINESS: Status: RESOLVED | Noted: 2017-10-29 | Resolved: 2023-03-29

## 2023-03-29 PROBLEM — R19.5 HARD STOOL: Status: RESOLVED | Noted: 2022-11-23 | Resolved: 2023-03-29

## 2023-03-29 PROBLEM — M35.9 AUTOIMMUNE DISEASE: Status: RESOLVED | Noted: 2018-04-12 | Resolved: 2023-03-29

## 2023-03-29 NOTE — ASSESSMENT & PLAN NOTE
Assistance with smoking cessation was offered, including:  [x]  Medications  [x]  Counseling  [x]  Printed Information on Smoking Cessation  [x]  Referral to a Smoking Cessation Program    Patient was counseled regarding smoking for 3-10 minutes.

## 2023-03-30 ENCOUNTER — PATIENT OUTREACH (OUTPATIENT)
Dept: FAMILY MEDICINE | Facility: CLINIC | Age: 63
End: 2023-03-30
Payer: MEDICARE

## 2023-04-06 ENCOUNTER — PATIENT OUTREACH (OUTPATIENT)
Dept: ADMINISTRATIVE | Facility: HOSPITAL | Age: 63
End: 2023-04-06

## 2023-04-18 ENCOUNTER — PATIENT OUTREACH (OUTPATIENT)
Dept: ADMINISTRATIVE | Facility: HOSPITAL | Age: 63
End: 2023-04-18

## 2023-04-18 NOTE — PROGRESS NOTES
Care gaps reviewed and needed for compliance.  Compliance noted in upcoming appointment.   Health Maintenance Due   Topic Date Due    Cervical Cancer Screening  Never done    COVID-19 Vaccine (1) Never done    Diabetes Urine Screening  Never done    Pneumococcal Vaccines (Age 0-64) (1 - PCV) Never done    Foot Exam  Never done    TETANUS VACCINE  Never done    Shingles Vaccine (1 of 2) Never done    LDCT Lung Screen  01/13/2023    Eye Exam  03/22/2023

## 2023-04-20 ENCOUNTER — OFFICE VISIT (OUTPATIENT)
Dept: FAMILY MEDICINE | Facility: CLINIC | Age: 63
End: 2023-04-20
Payer: MEDICARE

## 2023-04-20 ENCOUNTER — PATIENT OUTREACH (OUTPATIENT)
Dept: ADMINISTRATIVE | Facility: HOSPITAL | Age: 63
End: 2023-04-20

## 2023-04-20 VITALS
HEIGHT: 65 IN | BODY MASS INDEX: 33.15 KG/M2 | DIASTOLIC BLOOD PRESSURE: 69 MMHG | TEMPERATURE: 99 F | HEART RATE: 80 BPM | OXYGEN SATURATION: 90 % | WEIGHT: 199 LBS | SYSTOLIC BLOOD PRESSURE: 110 MMHG | RESPIRATION RATE: 18 BRPM

## 2023-04-20 DIAGNOSIS — F17.210 CIGARETTE NICOTINE DEPENDENCE WITHOUT COMPLICATION: Chronic | ICD-10-CM

## 2023-04-20 DIAGNOSIS — I10 ESSENTIAL HYPERTENSION: ICD-10-CM

## 2023-04-20 DIAGNOSIS — Z87.891 PERSONAL HISTORY OF NICOTINE DEPENDENCE: ICD-10-CM

## 2023-04-20 DIAGNOSIS — E11.9 TYPE 2 DIABETES MELLITUS WITHOUT COMPLICATION, WITHOUT LONG-TERM CURRENT USE OF INSULIN: Primary | ICD-10-CM

## 2023-04-20 PROBLEM — K62.5 RECTAL BLEED: Status: RESOLVED | Noted: 2023-02-07 | Resolved: 2023-04-20

## 2023-04-20 LAB
CREAT UR-MCNC: 18 MG/DL (ref 28–219)
MICROALBUMIN UR-MCNC: <0.5 MG/DL (ref 0–2.8)
MICROALBUMIN/CREAT RATIO PNL UR: <27.8 MG/G (ref 0–30)

## 2023-04-20 PROCEDURE — 82570 ASSAY OF URINE CREATININE: CPT | Mod: ,,, | Performed by: CLINICAL MEDICAL LABORATORY

## 2023-04-20 PROCEDURE — 1159F PR MEDICATION LIST DOCUMENTED IN MEDICAL RECORD: ICD-10-PCS | Mod: ,,, | Performed by: NURSE PRACTITIONER

## 2023-04-20 PROCEDURE — 3074F SYST BP LT 130 MM HG: CPT | Mod: ,,, | Performed by: NURSE PRACTITIONER

## 2023-04-20 PROCEDURE — 3008F BODY MASS INDEX DOCD: CPT | Mod: ,,, | Performed by: NURSE PRACTITIONER

## 2023-04-20 PROCEDURE — 99213 OFFICE O/P EST LOW 20 MIN: CPT | Mod: ,,, | Performed by: NURSE PRACTITIONER

## 2023-04-20 PROCEDURE — 3078F DIAST BP <80 MM HG: CPT | Mod: ,,, | Performed by: NURSE PRACTITIONER

## 2023-04-20 PROCEDURE — 1160F RVW MEDS BY RX/DR IN RCRD: CPT | Mod: ,,, | Performed by: NURSE PRACTITIONER

## 2023-04-20 PROCEDURE — 1159F MED LIST DOCD IN RCRD: CPT | Mod: ,,, | Performed by: NURSE PRACTITIONER

## 2023-04-20 PROCEDURE — 99213 PR OFFICE/OUTPT VISIT, EST, LEVL III, 20-29 MIN: ICD-10-PCS | Mod: ,,, | Performed by: NURSE PRACTITIONER

## 2023-04-20 PROCEDURE — 82570 MICROALBUMIN / CREATININE RATIO URINE: ICD-10-PCS | Mod: ,,, | Performed by: CLINICAL MEDICAL LABORATORY

## 2023-04-20 PROCEDURE — 82043 UR ALBUMIN QUANTITATIVE: CPT | Mod: ,,, | Performed by: CLINICAL MEDICAL LABORATORY

## 2023-04-20 PROCEDURE — 3008F PR BODY MASS INDEX (BMI) DOCUMENTED: ICD-10-PCS | Mod: ,,, | Performed by: NURSE PRACTITIONER

## 2023-04-20 PROCEDURE — 82043 MICROALBUMIN / CREATININE RATIO URINE: ICD-10-PCS | Mod: ,,, | Performed by: CLINICAL MEDICAL LABORATORY

## 2023-04-20 PROCEDURE — 1160F PR REVIEW ALL MEDS BY PRESCRIBER/CLIN PHARMACIST DOCUMENTED: ICD-10-PCS | Mod: ,,, | Performed by: NURSE PRACTITIONER

## 2023-04-20 PROCEDURE — 3074F PR MOST RECENT SYSTOLIC BLOOD PRESSURE < 130 MM HG: ICD-10-PCS | Mod: ,,, | Performed by: NURSE PRACTITIONER

## 2023-04-20 PROCEDURE — 3078F PR MOST RECENT DIASTOLIC BLOOD PRESSURE < 80 MM HG: ICD-10-PCS | Mod: ,,, | Performed by: NURSE PRACTITIONER

## 2023-04-20 RX ORDER — IBUPROFEN 200 MG
1 TABLET ORAL
COMMUNITY
Start: 2023-04-18 | End: 2023-05-18

## 2023-04-20 RX ORDER — LANCETS
EACH MISCELLANEOUS
COMMUNITY
Start: 2023-04-19 | End: 2023-10-24 | Stop reason: SDUPTHER

## 2023-04-20 RX ORDER — MICONAZOLE NITRATE 2 %
2 CREAM (GRAM) TOPICAL DAILY PRN
COMMUNITY
Start: 2023-04-18 | End: 2023-05-18

## 2023-04-20 NOTE — PROGRESS NOTES
Protective Sensation (w/ 10 gram monofilament):  Right: Intact  Left: Intact    Visual Inspection:  Normal -  Bilateral, Nails Intact - without Evidence of Foot Deformity- Bilateral, and Dry Skin -  Bilateral to heels and bottom of feet    Pedal Pulses:   Right: Present  Left: Present    Posterior Tibialis Pulses:   Right:Present  Left: Present

## 2023-04-20 NOTE — LETTER
AUTHORIZATION FOR RELEASE OF   CONFIDENTIAL INFORMATION    Dear [facility] ,    We are seeing Genia Madison, date of birth 1960, in the clinic at [facility]. MIKE Pedraza is the patient's PCP. Genia Madison has an outstanding lab/procedure at the time we reviewed her chart. In order to help keep her health information updated, she has authorized us to request the following medical record(s):        (  )  MAMMOGRAM                                      (  )  COLONOSCOPY      ( x )  PAP SMEAR                                          (X  )  OUTSIDE LAB RESULTS     (  )  DEXA SCAN                                          (  )  EYE EXAM            (  )  FOOT EXAM                                          (  )  ENTIRE RECORD     (  )  OUTSIDE IMMUNIZATIONS                 (  )  _______________         Please fax records to [facility], Mary Elizabeth Spreafico, FNP, [phone]   If you have any questions, please contact Joelle Arnold at [phone]          Patient Name: Genia Madison  : 1960  Patient Phone #: [phone]

## 2023-04-20 NOTE — LETTER
AUTHORIZATION FOR RELEASE OF   CONFIDENTIAL INFORMATION    Dear [facility] ,    We are seeing Genia Madison, date of birth 1960, in the clinic at [facility]. MIKE Pedraza is the patient's PCP. Genia Madison has an outstanding lab/procedure at the time we reviewed her chart. In order to help keep her health information updated, she has authorized us to request the following medical record(s):        (  )  MAMMOGRAM                                      (  )  COLONOSCOPY      (  )  PAP SMEAR                                          (  )  OUTSIDE LAB RESULTS     (  )  DEXA SCAN                                          (  )  EYE EXAM            (  )  FOOT EXAM                                          (  )  ENTIRE RECORD     (  )  OUTSIDE IMMUNIZATIONS                 (  )  _______________         Please fax records to [facility], Mary Elizabeth Spreafico, FNP, ***     If you have any questions, please contact *** at [phone]           Patient Name: Genia Madison  : 1960  Patient Phone #: [phone]

## 2023-04-20 NOTE — PROGRESS NOTES
2023     MIKE Pedraza   [facility]  [address]  [place] [address]     PATIENT NAME: Genia Madison  : 1960  DATE: 23  MRN: [id number]      Billing Provider: MIKE Pedraza  Level of Service:   Patient PCP Information       Provider PCP Type    MIKE Pedraza General            Reason for Visit / Chief Complaint: Low Dose CT Screening Counseling (Patient has been attending a class to stop smoking) and Health Maintenance (Cervical Cancer Screening-patient states she had one a couple of years ago at [facility]/COVID-19 Vaccine(1)-declined/Diabetes Urine Screening-ordered today/Pneumococcal Vaccines (Age 0-64)(1 - PCV)-states she has had one in the past but not in [facility]II[facility]/Foot Exam Never-will do today/TETANUS VACCINE-declined/Shingles Vaccine(1 of 2)-declined/LDCT Lung Screen due on 2023-will order today/Eye Exam due on 2023-declines scheduling at this time, she said she will scheduled with [facility] )   Health Maintenance Due   Topic Date Due    Cervical Cancer Screening  Never done    COVID-19 Vaccine (1) Never done    Diabetes Urine Screening  Never done    Pneumococcal Vaccines (Age 0-64) (1 - PCV) Never done    TETANUS VACCINE  Never done    Shingles Vaccine (1 of 2) Never done    LDCT Lung Screen  2023    Eye Exam  2023          Update PCP  Update Chief Complaint         History of Present Illness / Problem Focused Workflow     Genia Madison presents to the clinic follow up on smoking cessation has picked up patches but not stopped yet.  Patient is a current smoker, has a 22.5 pack year history, has no s/s of lung cancer, and is currently 62 years old.    Counseling and Shared Decision Making requirements:   * I have discussed the information and determined that the patient meets all of the criteria   *I have counseled the patient on the importance of smoking cessation   *I have provided to the patient smoking  cessation counseling / abstinence counseling and provided patient with ways on how to quit   *Previous smoking cessation interventions include patches and gum .   *I have discussed with the patient the importance of annual screening if the patient continues to meet the above criteria.   * The patient has the following comorbid conditions that I am aware of that may affect screening, further testing, or treatment:       SEE SCANNED LOW DOSE CT ORDER DOCUMENTS     No results found for: HGBA1C     CMP  No results found for: NA, K, CL, CO2, GLU, BUN, CREATININE, CALCIUM, PROT, ALBUMIN, BILITOT, ALKPHOS, AST, ALT, ANIONGAP, EGFRNORACEVR     No results found for: WBC, RBC, HGB, HCT, MCV, MCH, MCHC, RDW, PLT, MPV, GRAN, LYMPH, MONO, EOS, BASO, EOSINOPHIL, BASOPHIL     No results found for: CHOL  No results found for: HDL  No results found for: LDLCALC  No results found for: TRIG  No results found for: CHOLHDL     Wt Readings from Last 3 Encounters:   04/20/23 0952 90.3 kg (199 lb)   03/28/23 1505 90.3 kg (199 lb 0.6 oz)   02/07/23 1100 92 kg (202 lb 12.8 oz)        BP Readings from Last 3 Encounters:   04/20/23 110/69   03/28/23 130/74   02/07/23 130/84        Review of Systems     Review of Systems   Constitutional:  Negative for appetite change and fever.   Respiratory:  Positive for shortness of breath. Negative for chest tightness.    Cardiovascular:  Negative for chest pain and palpitations.   Gastrointestinal:  Negative for change in bowel habit and change in bowel habit.   Genitourinary:  Negative for difficulty urinating.   Neurological:  Negative for syncope and headaches.      Medical / Social / Family History     Past Medical History:   Diagnosis Date    Abnormal chest CT     1/13/22 [facility]    Cough     bronchitis 11/2021    Diabetes mellitus, type 2     GERD (gastroesophageal reflux disease)     Hyperlipidemia     Hypertension     Hypoxia     O2  2L @ HS    Migraine     LIZET (obstructive sleep  apnea)     Seizures     Wheeze        Past Surgical History:   Procedure Laterality Date    BRAIN SURGERY      1/1/2001: [facility]       Social History  Ms.  reports that she has been smoking cigarettes. She started smoking about 45 years ago. She has a 22.50 pack-year smoking history. She has been exposed to tobacco smoke. She has never used smokeless tobacco. She reports that she does not currently use alcohol. She reports that she does not currently use drugs.    Family History  Ms.'s family history includes Alzheimer's disease in her brother and mother; Diabetes in her brother; Heart attack in her brother; Stomach cancer in her father.    Medications and Allergies     Medications  Outpatient Medications Marked as Taking for the 4/20/23 encounter (Office Visit) with Padmini Green, MIKE   Medication Sig Dispense Refill    ACCU-CHEK GUIDE TEST STRIPS Strp       ACCU-CHEK SOFTCLIX LANCETS Misc       albuterol (PROVENTIL/VENTOLIN HFA) 90 mcg/actuation inhaler Inhale 2 puffs into the lungs every 4 (four) hours as needed for Wheezing. Rescue      amitriptyline (ELAVIL) 75 MG tablet Take 150 mg by mouth every evening. 75 mg: tab 2 at HS (150 mg @ HS)      amLODIPine (NORVASC) 2.5 MG tablet Take 2.5 mg by mouth once daily.      aspirin (ASPIR-81 ORAL) Take 1 tablet by mouth once daily.      atenoloL-chlorthalidone (TENORETIC) 100-25 mg per tablet Take 1 tablet by mouth once daily.      atorvastatin (LIPITOR) 40 MG tablet Take 40 mg by mouth once daily.      docusate sodium (COLACE) 100 MG capsule Take 100 mg by mouth 2 (two) times daily. Listed as 2 caps daily      fluticasone propionate (FLONASE) 50 mcg/actuation nasal spray 2 sprays by Each Nostril route once daily.      glipiZIDE (GLUCOTROL) 2.5 MG TR24 Take 2.5 mg by mouth daily with breakfast.      ibuprofen (ADVIL,MOTRIN) 600 MG tablet Take 600 mg by mouth 3 (three) times daily as needed for Pain.      metFORMIN (GLUCOPHAGE) 1000 MG tablet Take 1,000 mg by  "mouth 2 (two) times daily with meals.      nicotine (NICODERM CQ) 14 mg/24 hr Place 1 patch onto the skin.      nicotine, polacrilex, (NICORETTE) 2 mg Gum Take 2 mg by mouth daily as needed.      omeprazole (PRILOSEC) 40 MG capsule Take 40 mg by mouth every morning.         Allergies  Review of patient's allergies indicates:   Allergen Reactions    Lisinopril     Pcn [penicillins]        Physical Examination     Vitals:    04/20/23 0952   BP: 110/69   BP Location: Left arm   Patient Position: Sitting   Pulse: 80   Resp: 18   Temp: 98.9 °F (37.2 °C)   TempSrc: Oral   SpO2: (!) 90%  Comment: Patient states she sleeps under CPAP and O2   Weight: 90.3 kg (199 lb)   Height: 5' 4.5" (1.638 m)      Physical Exam  Vitals and nursing note reviewed.   HENT:      Right Ear: External ear normal.      Left Ear: External ear normal.      Nose: Nose normal.      Mouth/Throat:      Mouth: Mucous membranes are moist.   Eyes:      Conjunctiva/sclera: Conjunctivae normal.      Pupils: Pupils are equal, round, and reactive to light.   Cardiovascular:      Rate and Rhythm: Normal rate and regular rhythm.   Pulmonary:      Effort: Pulmonary effort is normal.      Breath sounds: Wheezing present.   Musculoskeletal:      Cervical back: Normal range of motion and neck supple.   Skin:     Capillary Refill: Capillary refill takes less than 2 seconds.   Neurological:      Mental Status: She is alert and oriented to person, place, and time.        Assessment and Plan (including Health Maintenance)      Problem List  Smart Sets  Document Outside HM   :    Plan:     Patient Instructions   Get low dose CT scan set quit sara and start program to stop smoking.    Health Maintenance Due   Topic Date Due    Cervical Cancer Screening  Never done    COVID-19 Vaccine (1) Never done    Diabetes Urine Screening  Never done    Pneumococcal Vaccines (Age 0-64) (1 - PCV) Never done    TETANUS VACCINE  Never done    Shingles Vaccine (1 of 2) Never done    LDCT " Lung Screen  01/13/2023    Eye Exam  03/22/2023        Health Maintenance Due   Topic Date Due    Cervical Cancer Screening  Never done    COVID-19 Vaccine (1) Never done    Diabetes Urine Screening  Never done    Pneumococcal Vaccines (Age 0-64) (1 - PCV) Never done    TETANUS VACCINE  Never done    Shingles Vaccine (1 of 2) Never done    LDCT Lung Screen  01/13/2023    Eye Exam  03/22/2023       Problem List Items Addressed This Visit          Cardiac/Vascular    Essential hypertension    Overview     Last Assessment & Plan:   Formatting of this note might be different from the original.  Discussed importance of monitoring BP  Advised to purchase a BP machine    Blood pressure checks - daily preferred  - The top number should be LESS than 140, but GREATER than 90  - The bottom number should be LESS than 90, but GREATER than 60  - so that is LESS than = 140/90 and GREATER than 90/60    Pulse should be LESS than 100, GREATER than 60           Current Assessment & Plan     Monitor blood pressure.  The current medical regimen is effective;  continue present plan and medications.               Endocrine    Type 2 diabetes mellitus without complication, without long-term current use of insulin - Primary    Overview     Last Assessment & Plan:   Formatting of this note might be different from the original.  Hgb A1C today           Current Assessment & Plan     - work on diet, specifically cutting back bread, breaded things, cereal, pasta, potatoes, rice  - try to exercise at least 150min a week divided however you want  - if you can do weight, even very light weight do them  - keep an eye on sugars, fasting sugar goal , after eating no greater than 180  - A1C goal is around 7.0%  - continue current regiment and please note if any changes were made         Relevant Orders    Microalbumin/Creatinine Ratio, Urine    DIABETIC SHOES FOR HOME USE    CT Chest Lung Screening Low Dose       Other    Cigarette nicotine  dependence without complication (Chronic)    Current Assessment & Plan     Assistance with smoking cessation was offered, including:  [x]  Medications  [x]  Counseling  [x]  Printed Information on Smoking Cessation  [x]  Referral to a Smoking Cessation Program    Patient was counseled regarding smoking for 3-10 minutes.          Other Visit Diagnoses       Personal history of nicotine dependence        Relevant Orders    CT Chest Lung Screening Low Dose          Type 2 diabetes mellitus without complication, without long-term current use of insulin  -     Microalbumin/Creatinine Ratio, Urine; Future; Expected date: 04/20/2023  -     DIABETIC SHOES FOR HOME USE  -     CT Chest Lung Screening Low Dose; Future; Expected date: 04/20/2023    Personal history of nicotine dependence  -     CT Chest Lung Screening Low Dose; Future; Expected date: 04/20/2023    Essential hypertension    Cigarette nicotine dependence without complication       Health Maintenance Topics with due status: Not Due       Topic Last Completion Date    Colorectal Cancer Screening 05/31/2022    Lipid Panel 12/15/2022    Hemoglobin A1c 12/15/2022    Mammogram 02/22/2023    Foot Exam 04/20/2023       Procedures   Health Maintenance Due   Topic Date Due    Cervical Cancer Screening  Never done    COVID-19 Vaccine (1) Never done    Diabetes Urine Screening  Never done    Pneumococcal Vaccines (Age 0-64) (1 - PCV) Never done    TETANUS VACCINE  Never done    Shingles Vaccine (1 of 2) Never done    LDCT Lung Screen  01/13/2023    Eye Exam  03/22/2023        Future Appointments   Date Time Provider Department Center   5/5/2023 10:30 AM [facility] CT1 OB [facility] [place]   5/11/2023  1:15 PM MIKE Pedraza [facility] [employer] [place]   8/10/2023  9:45 AM [facility] [employer] [place]   4/3/2024  3:30 PM [facility] [employer] [place]        Follow up follow up after CT results.    Health  Maintenance Due   Topic Date Due    Cervical Cancer Screening  Never done    COVID-19 Vaccine (1) Never done    Diabetes Urine Screening  Never done    Pneumococcal Vaccines (Age 0-64) (1 - PCV) Never done    TETANUS VACCINE  Never done    Shingles Vaccine (1 of 2) Never done    LDCT Lung Screen  01/13/2023    Eye Exam  03/22/2023        Signature:  MIKE Pedraza    Date of encounter: 4/20/23   oral

## 2023-04-20 NOTE — ASSESSMENT & PLAN NOTE
Monitor blood pressure.  The current medical regimen is effective;  continue present plan and medications.

## 2023-04-20 NOTE — Clinical Note
Cervical Cancer Screening-patient states she had one a couple of years ago at [facility]-19 Vaccine(1)-declined Diabetes Urine Screening-ordered today Pneumococcal Vaccines (Age 0-64)(1 - PCV) Never done Foot Exam Never-will do today TETANUS VACCINE-declined Shingles Vaccine(1 of 2)-declined LDCT Lung Screen due on 01/13/2023-will order today Eye Exam due on 03/22/2023-declines scheduling at this time, she said she will scheduled with [facility]

## 2023-05-15 PROBLEM — N39.0 CHRONIC UTI (URINARY TRACT INFECTION): Status: RESOLVED | Noted: 2019-10-29 | Resolved: 2023-05-15

## 2023-05-19 ENCOUNTER — PATIENT OUTREACH (OUTPATIENT)
Dept: ADMINISTRATIVE | Facility: HOSPITAL | Age: 63
End: 2023-05-19

## 2023-05-19 NOTE — PROGRESS NOTES
Health maintenance record review for population health care gaps    Closing care gaps for [employer] insurance.  Was able to retrieve some of the care gaps for [employer] at this time

## 2023-07-19 ENCOUNTER — OFFICE VISIT (OUTPATIENT)
Dept: FAMILY MEDICINE | Facility: CLINIC | Age: 63
End: 2023-07-19
Payer: MEDICARE

## 2023-07-19 VITALS
SYSTOLIC BLOOD PRESSURE: 149 MMHG | OXYGEN SATURATION: 85 % | BODY MASS INDEX: 33.66 KG/M2 | HEIGHT: 65 IN | WEIGHT: 202 LBS | TEMPERATURE: 98 F | RESPIRATION RATE: 18 BRPM | DIASTOLIC BLOOD PRESSURE: 95 MMHG | HEART RATE: 87 BPM

## 2023-07-19 DIAGNOSIS — G62.9 NEUROPATHY: ICD-10-CM

## 2023-07-19 DIAGNOSIS — I10 ESSENTIAL HYPERTENSION: ICD-10-CM

## 2023-07-19 DIAGNOSIS — E11.9 TYPE 2 DIABETES MELLITUS WITHOUT COMPLICATION, WITHOUT LONG-TERM CURRENT USE OF INSULIN: Primary | ICD-10-CM

## 2023-07-19 DIAGNOSIS — D64.9 ANEMIA, UNSPECIFIED TYPE: ICD-10-CM

## 2023-07-19 DIAGNOSIS — G89.29 OTHER CHRONIC PAIN: ICD-10-CM

## 2023-07-19 DIAGNOSIS — I99.8 ISCHEMIA: ICD-10-CM

## 2023-07-19 DIAGNOSIS — E78.5 HYPERLIPIDEMIA, UNSPECIFIED HYPERLIPIDEMIA TYPE: ICD-10-CM

## 2023-07-19 DIAGNOSIS — K59.00 CONSTIPATION, UNSPECIFIED CONSTIPATION TYPE: ICD-10-CM

## 2023-07-19 DIAGNOSIS — E11.8 MULTIPLE COMPLICATIONS OF TYPE 2 DIABETES MELLITUS: ICD-10-CM

## 2023-07-19 DIAGNOSIS — K21.9 GASTROESOPHAGEAL REFLUX DISEASE, UNSPECIFIED WHETHER ESOPHAGITIS PRESENT: ICD-10-CM

## 2023-07-19 LAB
ALBUMIN SERPL BCP-MCNC: 3.5 G/DL (ref 3.5–5)
ALBUMIN/GLOB SERPL: 0.9 {RATIO}
ALP SERPL-CCNC: 81 U/L (ref 50–130)
ALT SERPL W P-5'-P-CCNC: 26 U/L (ref 13–56)
ANION GAP SERPL CALCULATED.3IONS-SCNC: 11 MMOL/L (ref 7–16)
ANISOCYTOSIS BLD QL SMEAR: ABNORMAL
AST SERPL W P-5'-P-CCNC: 15 U/L (ref 15–37)
BASOPHILS # BLD AUTO: 0.08 K/UL (ref 0–0.2)
BASOPHILS NFR BLD AUTO: 0.8 % (ref 0–1)
BILIRUB SERPL-MCNC: 0.4 MG/DL (ref ?–1.2)
BUN SERPL-MCNC: 25 MG/DL (ref 7–18)
BUN/CREAT SERPL: 24 (ref 6–20)
CALCIUM SERPL-MCNC: 9.7 MG/DL (ref 8.5–10.1)
CHLORIDE SERPL-SCNC: 103 MMOL/L (ref 98–107)
CHOLEST SERPL-MCNC: 134 MG/DL (ref 0–200)
CHOLEST/HDLC SERPL: 3 {RATIO}
CK SERPL-CCNC: 93 U/L (ref 26–192)
CO2 SERPL-SCNC: 28 MMOL/L (ref 21–32)
CREAT SERPL-MCNC: 1.03 MG/DL (ref 0.55–1.02)
DIFFERENTIAL METHOD BLD: ABNORMAL
EGFR (NO RACE VARIABLE) (RUSH/TITUS): 62 ML/MIN/1.73M2
EOSINOPHIL # BLD AUTO: 0.53 K/UL (ref 0–0.5)
EOSINOPHIL NFR BLD AUTO: 5.2 % (ref 1–4)
ERYTHROCYTE [DISTWIDTH] IN BLOOD BY AUTOMATED COUNT: 19.2 % (ref 11.5–14.5)
EST. AVERAGE GLUCOSE BLD GHB EST-MCNC: 134 MG/DL
GLOBULIN SER-MCNC: 4.1 G/DL (ref 2–4)
GLUCOSE SERPL-MCNC: 94 MG/DL (ref 74–106)
HBA1C MFR BLD HPLC: 6.6 % (ref 4.5–6.6)
HCT VFR BLD AUTO: 36.4 % (ref 38–47)
HDLC SERPL-MCNC: 44 MG/DL (ref 40–60)
HGB BLD-MCNC: 10.9 G/DL (ref 12–16)
HYPOCHROMIA BLD QL SMEAR: ABNORMAL
IMM GRANULOCYTES # BLD AUTO: 0.04 K/UL (ref 0–0.04)
IMM GRANULOCYTES NFR BLD: 0.4 % (ref 0–0.4)
LDLC SERPL CALC-MCNC: 58 MG/DL
LDLC/HDLC SERPL: 1.3 {RATIO}
LYMPHOCYTES # BLD AUTO: 2.62 K/UL (ref 1–4.8)
LYMPHOCYTES NFR BLD AUTO: 25.7 % (ref 27–41)
MCH RBC QN AUTO: 21.2 PG (ref 27–31)
MCHC RBC AUTO-ENTMCNC: 29.9 G/DL (ref 32–36)
MCV RBC AUTO: 70.7 FL (ref 80–96)
MICROCYTES BLD QL SMEAR: ABNORMAL
MONOCYTES # BLD AUTO: 0.55 K/UL (ref 0–0.8)
MONOCYTES NFR BLD AUTO: 5.4 % (ref 2–6)
MPC BLD CALC-MCNC: 10.3 FL (ref 9.4–12.4)
NEUTROPHILS # BLD AUTO: 6.39 K/UL (ref 1.8–7.7)
NEUTROPHILS NFR BLD AUTO: 62.5 % (ref 53–65)
NONHDLC SERPL-MCNC: 90 MG/DL
NRBC # BLD AUTO: 0 X10E3/UL
NRBC, AUTO (.00): 0 %
OVALOCYTES BLD QL SMEAR: ABNORMAL
PLATELET # BLD AUTO: 350 K/UL (ref 150–400)
PLATELET MORPHOLOGY: ABNORMAL
POLYCHROMASIA BLD QL SMEAR: ABNORMAL
POTASSIUM SERPL-SCNC: 3.7 MMOL/L (ref 3.5–5.1)
PROT SERPL-MCNC: 7.6 G/DL (ref 6.4–8.2)
RBC # BLD AUTO: 5.15 M/UL (ref 4.2–5.4)
SODIUM SERPL-SCNC: 138 MMOL/L (ref 136–145)
STOMATOCYTES BLD QL SMEAR: ABNORMAL
TARGETS BLD QL SMEAR: ABNORMAL
TRIGL SERPL-MCNC: 160 MG/DL (ref 35–150)
VLDLC SERPL-MCNC: 32 MG/DL
WBC # BLD AUTO: 10.21 K/UL (ref 4.5–11)

## 2023-07-19 PROCEDURE — 1160F PR REVIEW ALL MEDS BY PRESCRIBER/CLIN PHARMACIST DOCUMENTED: ICD-10-PCS | Mod: ,,, | Performed by: NURSE PRACTITIONER

## 2023-07-19 PROCEDURE — 3077F SYST BP >= 140 MM HG: CPT | Mod: ,,, | Performed by: NURSE PRACTITIONER

## 2023-07-19 PROCEDURE — 82550 ASSAY OF CK (CPK): CPT | Mod: ,,, | Performed by: CLINICAL MEDICAL LABORATORY

## 2023-07-19 PROCEDURE — 3080F DIAST BP >= 90 MM HG: CPT | Mod: ,,, | Performed by: NURSE PRACTITIONER

## 2023-07-19 PROCEDURE — 3008F PR BODY MASS INDEX (BMI) DOCUMENTED: ICD-10-PCS | Mod: ,,, | Performed by: NURSE PRACTITIONER

## 2023-07-19 PROCEDURE — 3061F PR NEG MICROALBUMINURIA RESULT DOCUMENTED/REVIEW: ICD-10-PCS | Mod: ,,, | Performed by: NURSE PRACTITIONER

## 2023-07-19 PROCEDURE — 99214 OFFICE O/P EST MOD 30 MIN: CPT | Mod: ,,, | Performed by: NURSE PRACTITIONER

## 2023-07-19 PROCEDURE — 3061F NEG MICROALBUMINURIA REV: CPT | Mod: ,,, | Performed by: NURSE PRACTITIONER

## 2023-07-19 PROCEDURE — 1160F RVW MEDS BY RX/DR IN RCRD: CPT | Mod: ,,, | Performed by: NURSE PRACTITIONER

## 2023-07-19 PROCEDURE — 3066F PR DOCUMENTATION OF TREATMENT FOR NEPHROPATHY: ICD-10-PCS | Mod: ,,, | Performed by: NURSE PRACTITIONER

## 2023-07-19 PROCEDURE — 3080F PR MOST RECENT DIASTOLIC BLOOD PRESSURE >= 90 MM HG: ICD-10-PCS | Mod: ,,, | Performed by: NURSE PRACTITIONER

## 2023-07-19 PROCEDURE — 1159F MED LIST DOCD IN RCRD: CPT | Mod: ,,, | Performed by: NURSE PRACTITIONER

## 2023-07-19 PROCEDURE — 1159F PR MEDICATION LIST DOCUMENTED IN MEDICAL RECORD: ICD-10-PCS | Mod: ,,, | Performed by: NURSE PRACTITIONER

## 2023-07-19 PROCEDURE — 99214 PR OFFICE/OUTPT VISIT, EST, LEVL IV, 30-39 MIN: ICD-10-PCS | Mod: ,,, | Performed by: NURSE PRACTITIONER

## 2023-07-19 PROCEDURE — 3008F BODY MASS INDEX DOCD: CPT | Mod: ,,, | Performed by: NURSE PRACTITIONER

## 2023-07-19 PROCEDURE — 83036 HEMOGLOBIN A1C: ICD-10-PCS | Mod: ,,, | Performed by: CLINICAL MEDICAL LABORATORY

## 2023-07-19 PROCEDURE — 3066F NEPHROPATHY DOC TX: CPT | Mod: ,,, | Performed by: NURSE PRACTITIONER

## 2023-07-19 PROCEDURE — 80053 COMPREHENSIVE METABOLIC PANEL: ICD-10-PCS | Mod: ,,, | Performed by: CLINICAL MEDICAL LABORATORY

## 2023-07-19 PROCEDURE — 83036 HEMOGLOBIN GLYCOSYLATED A1C: CPT | Mod: ,,, | Performed by: CLINICAL MEDICAL LABORATORY

## 2023-07-19 PROCEDURE — 80061 LIPID PANEL: CPT | Mod: ,,, | Performed by: CLINICAL MEDICAL LABORATORY

## 2023-07-19 PROCEDURE — 80053 COMPREHEN METABOLIC PANEL: CPT | Mod: ,,, | Performed by: CLINICAL MEDICAL LABORATORY

## 2023-07-19 PROCEDURE — 85025 CBC WITH DIFFERENTIAL: ICD-10-PCS | Mod: ,,, | Performed by: CLINICAL MEDICAL LABORATORY

## 2023-07-19 PROCEDURE — 85025 COMPLETE CBC W/AUTO DIFF WBC: CPT | Mod: ,,, | Performed by: CLINICAL MEDICAL LABORATORY

## 2023-07-19 PROCEDURE — 80061 LIPID PANEL: ICD-10-PCS | Mod: ,,, | Performed by: CLINICAL MEDICAL LABORATORY

## 2023-07-19 PROCEDURE — 82550 CK: ICD-10-PCS | Mod: ,,, | Performed by: CLINICAL MEDICAL LABORATORY

## 2023-07-19 PROCEDURE — 3077F PR MOST RECENT SYSTOLIC BLOOD PRESSURE >= 140 MM HG: ICD-10-PCS | Mod: ,,, | Performed by: NURSE PRACTITIONER

## 2023-07-19 RX ORDER — IBUPROFEN 600 MG/1
600 TABLET ORAL 3 TIMES DAILY PRN
Qty: 270 TABLET | Refills: 1 | Status: SHIPPED | OUTPATIENT
Start: 2023-07-19 | End: 2024-01-19 | Stop reason: ALTCHOICE

## 2023-07-19 RX ORDER — IBUPROFEN 200 MG
1 TABLET ORAL DAILY
COMMUNITY
Start: 2023-07-18 | End: 2023-08-17

## 2023-07-19 RX ORDER — METFORMIN HYDROCHLORIDE 1000 MG/1
1000 TABLET ORAL 2 TIMES DAILY WITH MEALS
Qty: 180 TABLET | Refills: 1 | Status: SHIPPED | OUTPATIENT
Start: 2023-07-19 | End: 2023-10-24 | Stop reason: SDUPTHER

## 2023-07-19 RX ORDER — AMLODIPINE BESYLATE 2.5 MG/1
2.5 TABLET ORAL DAILY
Qty: 90 TABLET | Refills: 1 | Status: SHIPPED | OUTPATIENT
Start: 2023-07-19 | End: 2023-08-30

## 2023-07-19 RX ORDER — OMEPRAZOLE 40 MG/1
40 CAPSULE, DELAYED RELEASE ORAL EVERY MORNING
Qty: 90 CAPSULE | Refills: 1 | Status: SHIPPED | OUTPATIENT
Start: 2023-07-19 | End: 2023-10-24 | Stop reason: SDUPTHER

## 2023-07-19 RX ORDER — ASPIRIN 81 MG/1
81 TABLET ORAL DAILY
Qty: 90 TABLET | Refills: 1 | Status: SHIPPED | OUTPATIENT
Start: 2023-07-19 | End: 2023-08-30

## 2023-07-19 RX ORDER — ATENOLOL AND CHLORTHALIDONE TABLET 100; 25 MG/1; MG/1
1 TABLET ORAL DAILY
Qty: 90 TABLET | Refills: 1 | Status: SHIPPED | OUTPATIENT
Start: 2023-07-19 | End: 2023-10-24 | Stop reason: SDUPTHER

## 2023-07-19 RX ORDER — FERROUS SULFATE 325(65) MG
325 TABLET ORAL
Qty: 90 TABLET | Refills: 1 | Status: SHIPPED | OUTPATIENT
Start: 2023-07-19 | End: 2023-08-30

## 2023-07-19 RX ORDER — AMITRIPTYLINE HYDROCHLORIDE 75 MG/1
150 TABLET ORAL NIGHTLY
Qty: 180 TABLET | Refills: 1 | Status: SHIPPED | OUTPATIENT
Start: 2023-07-19 | End: 2023-10-24 | Stop reason: SDUPTHER

## 2023-07-19 RX ORDER — DOCUSATE SODIUM 100 MG/1
100 CAPSULE, LIQUID FILLED ORAL 2 TIMES DAILY
Qty: 180 CAPSULE | Refills: 1 | Status: SHIPPED | OUTPATIENT
Start: 2023-07-19 | End: 2023-10-24 | Stop reason: SDUPTHER

## 2023-07-19 RX ORDER — ATORVASTATIN CALCIUM 40 MG/1
40 TABLET, FILM COATED ORAL DAILY
Qty: 90 TABLET | Refills: 1 | Status: SHIPPED | OUTPATIENT
Start: 2023-07-19 | End: 2023-08-30

## 2023-07-19 RX ORDER — MICONAZOLE NITRATE 2 %
2 CREAM (GRAM) TOPICAL DAILY PRN
COMMUNITY
Start: 2023-07-10 | End: 2023-08-09

## 2023-07-19 RX ORDER — GLIPIZIDE 2.5 MG/1
2.5 TABLET, EXTENDED RELEASE ORAL
Qty: 90 TABLET | Refills: 1 | Status: SHIPPED | OUTPATIENT
Start: 2023-07-19 | End: 2023-10-24 | Stop reason: SDUPTHER

## 2023-07-19 NOTE — Clinical Note
[facility] Never done-declined Pneumococcal Vaccines (Age 0-64)(1 - PCV) Never done-declined TETANUS VACCINE Never done-declined Shingles Vaccine(1 of 2) Never done-declined Eye Exam due on 03/22/2023-will get done at [facility] Hemoglobin A1c due on 06/15/2023-today

## 2023-07-19 NOTE — PROGRESS NOTES
MIKE Pedraza   [facility]  [address]  [place] [address]     PATIENT NAME: Genia Madison  : 1960  DATE: 23  MRN: [id number]      Billing Provider: MIKE Pedraza  Level of Service:   Patient PCP Information       Provider PCP Type    MIKE Pedraza General            Reason for Visit / Chief Complaint: Diabetes, Hypertension, Hyperlipidemia, Health Maintenance (Cervical Cancer Screening Never done-Done at [facility], [place]/COVID-19 Vaccine(1) Never done-declined/Pneumococcal Vaccines (Age 0-64)(1 - PCV) Never done-declined/TETANUS VACCINE Never done-declined/Shingles Vaccine(1 of 2) Never done-declined/Eye Exam due on 2023-will get done at [facility]/Hemoglobin A1c due on 06/15/2023-today/), and Follow-up (Review Low Dose CT that was done at [facility])   Health Maintenance Due   Topic Date Due    Cervical Cancer Screening  Never done    COVID-19 Vaccine (1) Never done    Pneumococcal Vaccines (Age 0-64) (1 - PCV) Never done    TETANUS VACCINE  Never done    Shingles Vaccine (1 of 2) Never done    Eye Exam  2023    Hemoglobin A1c  06/15/2023          Update PCP  Update Chief Complaint         History of Present Illness / Problem Focused Workflow     Genia Madison presents to the clinic  for chronic condition follow up will obtain labs today, reports home BP checks better then in office and she is frustrated about check in time and has another appointment in [place] today. She had her and will review results-CT with emphysema no masses. She is still trying to stop smoking but has bad days and will break down and smoke.     No results found for: HGBA1C     CMP  No results found for: NA, K, CL, CO2, GLU, BUN, CREATININE, CALCIUM, PROT, ALBUMIN, BILITOT, ALKPHOS, AST, ALT, ANIONGAP, EGFRNORACEVR     No results found for: WBC, RBC, HGB, HCT, MCV, MCH, MCHC, RDW, PLT, MPV, GRAN, LYMPH, MONO, EOS, BASO, EOSINOPHIL, BASOPHIL      No results found for: CHOL  No results found for: HDL  No results found for: LDLCALC  No results found for: TRIG  No results found for: CHOLHDL     Wt Readings from Last 3 Encounters:   07/19/23 0903 91.6 kg (202 lb)   04/20/23 0952 90.3 kg (199 lb)   03/28/23 1505 90.3 kg (199 lb 0.6 oz)        BP Readings from Last 3 Encounters:   07/19/23 (!) 149/95   04/20/23 110/69   03/28/23 130/74        Review of Systems     Review of Systems   Constitutional:  Negative for appetite change and fever.   HENT:  Negative for trouble swallowing.    Respiratory:  Positive for shortness of breath. Negative for chest tightness.    Cardiovascular:  Negative for chest pain and palpitations.   Gastrointestinal:  Negative for change in bowel habit and change in bowel habit.   Genitourinary:  Negative for difficulty urinating.   Neurological:  Negative for syncope and headaches.   Hematological:  Negative for adenopathy.      Medical / Social / Family History     Past Medical History:   Diagnosis Date    Abnormal chest CT     1/13/22 [facility]    Cough     bronchitis 11/2021    Diabetes mellitus, type 2     GERD (gastroesophageal reflux disease)     Hyperlipidemia     Hypertension     Hypoxia     O2  2L @ HS    Migraine     LIZET (obstructive sleep apnea)     Seizures     Wheeze        Past Surgical History:   Procedure Laterality Date    BRAIN SURGERY      1/1/2001: [facility]       Social History  Ms.  reports that she has been smoking cigarettes. She started smoking about 45 years ago. She has a 22.50 pack-year smoking history. She has been exposed to tobacco smoke. She has never used smokeless tobacco. She reports that she does not currently use alcohol. She reports that she does not currently use drugs.    Family History  Ms.'s family history includes Alzheimer's disease in her brother and mother; Diabetes in her brother; Heart attack in her brother; Stomach cancer in her father.    Medications and Allergies  "    Medications  Outpatient Medications Marked as Taking for the 7/19/23 encounter (Office Visit) with Audelia MIKE Green   Medication Sig Dispense Refill    ACCU-CHEK GUIDE TEST STRIPS Strp       ACCU-CHEK SOFTCLIX LANCETS Misc       nicotine (NICODERM CQ) 14 mg/24 hr Place 1 patch onto the skin once daily.      nicotine, polacrilex, (NICORETTE) 2 mg Gum Take 2 mg by mouth daily as needed.      [DISCONTINUED] amitriptyline (ELAVIL) 75 MG tablet Take 150 mg by mouth every evening. 75 mg: tab 2 at HS (150 mg @ HS)      [DISCONTINUED] amLODIPine (NORVASC) 2.5 MG tablet Take 2.5 mg by mouth once daily.      [DISCONTINUED] aspirin (ASPIR-81 ORAL) Take 1 tablet by mouth once daily.      [DISCONTINUED] atenoloL-chlorthalidone (TENORETIC) 100-25 mg per tablet Take 1 tablet by mouth once daily.      [DISCONTINUED] atorvastatin (LIPITOR) 40 MG tablet Take 40 mg by mouth once daily.      [DISCONTINUED] docusate sodium (COLACE) 100 MG capsule Take 100 mg by mouth 2 (two) times daily. Listed as 2 caps daily      [DISCONTINUED] glipiZIDE (GLUCOTROL) 2.5 MG TR24 Take 2.5 mg by mouth daily with breakfast.      [DISCONTINUED] ibuprofen (ADVIL,MOTRIN) 600 MG tablet Take 600 mg by mouth 3 (three) times daily as needed for Pain.      [DISCONTINUED] metFORMIN (GLUCOPHAGE) 1000 MG tablet Take 1,000 mg by mouth 2 (two) times daily with meals.      [DISCONTINUED] omeprazole (PRILOSEC) 40 MG capsule Take 40 mg by mouth every morning.         Allergies  Review of patient's allergies indicates:   Allergen Reactions    Lisinopril     Pcn [penicillins]        Physical Examination     Vitals:    07/19/23 0903 07/19/23 1003   BP: (!) 143/93 (!) 149/95   BP Location: Left arm Left arm   Patient Position: Sitting Sitting   Pulse: 87    Resp: 18    Temp: 98.4 °F (36.9 °C)    TempSrc: Oral    SpO2: (!) 85%  Comment: uses O2 at home    Weight: 91.6 kg (202 lb)    Height: 5' 4.5" (1.638 m)       Physical Exam  Vitals and nursing note " reviewed.   HENT:      Right Ear: External ear normal.      Left Ear: External ear normal.      Nose: Nose normal.      Mouth/Throat:      Mouth: Mucous membranes are moist.   Eyes:      Conjunctiva/sclera: Conjunctivae normal.      Pupils: Pupils are equal, round, and reactive to light.   Cardiovascular:      Rate and Rhythm: Normal rate and regular rhythm.   Pulmonary:      Effort: Pulmonary effort is normal.      Breath sounds: Wheezing present.   Musculoskeletal:      Cervical back: Normal range of motion and neck supple.   Skin:     Capillary Refill: Capillary refill takes less than 2 seconds.   Neurological:      Mental Status: She is alert and oriented to person, place, and time.        Assessment and Plan (including Health Maintenance)      Problem List  Smart Sets  Document Outside HM   :    Plan:     Patient Instructions   Reviewed CT repeat 1 year encouraged to continue smoking cessation        Health Maintenance Due   Topic Date Due    Cervical Cancer Screening  Never done    COVID-19 Vaccine (1) Never done    Pneumococcal Vaccines (Age 0-64) (1 - PCV) Never done    TETANUS VACCINE  Never done    Shingles Vaccine (1 of 2) Never done    Eye Exam  03/22/2023    Hemoglobin A1c  06/15/2023       Problem List Items Addressed This Visit          Neuro    Neuropathy    Overview     Last Assessment & Plan:   Formatting of this note might be different from the original.  The current medical regimen is effective;  continue present plan and medications.   Gabapentin as directed         Current Assessment & Plan     Continue current treatment will monitor         Relevant Medications    amitriptyline (ELAVIL) 75 MG tablet       Cardiac/Vascular    Essential hypertension    Overview     Last Assessment & Plan:   Formatting of this note might be different from the original.  Discussed importance of monitoring BP  Advised to purchase a BP machine    Blood pressure checks - daily preferred  - The top number should be  LESS than 140, but GREATER than 90  - The bottom number should be LESS than 90, but GREATER than 60  - so that is LESS than = 140/90 and GREATER than 90/60    Pulse should be LESS than 100, GREATER than 60         Current Assessment & Plan     Pt reports blood pressure controlled at home please bring log next office visit  - work on diet, specifically cutting back bread, breaded things, cereal, pasta, potatoes, rice  - try to exercise at least 150min a week divided however you want  - if you can do weight, even very light weight do them  - try not to use to much salt, if any, remember that things that are preserved or frozen often contain large amounts of salt as a preserve         Relevant Medications    amLODIPine (NORVASC) 2.5 MG tablet    atenoloL-chlorthalidone (TENORETIC) 100-25 mg per tablet    Other Relevant Orders    Comprehensive Metabolic Panel    CBC Auto Differential    Hyperlipidemia    Current Assessment & Plan     Labs today continue current treatment will monitor         Relevant Medications    atorvastatin (LIPITOR) 40 MG tablet    Other Relevant Orders    Lipid Panel    CK    Ischemia    Relevant Medications    aspirin (ASPIR-81) 81 MG EC tablet    ibuprofen (ADVIL,MOTRIN) 600 MG tablet       Oncology    Anemia    Relevant Medications    ferrous sulfate (FEOSOL) 325 mg (65 mg iron) Tab tablet       Endocrine    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  Hgb A1C today         Current Assessment & Plan     A1C today will monitor and adjust         Relevant Medications    glipiZIDE (GLUCOTROL) 2.5 MG TR24    metFORMIN (GLUCOPHAGE) 1000 MG tablet    Other Relevant Orders    Hemoglobin A1C      Other Visit Diagnoses       Gastroesophageal reflux disease, unspecified whether esophagitis present        Relevant Medications    omeprazole (PRILOSEC) 40 MG capsule    Other chronic pain        Relevant Medications    ibuprofen (ADVIL,MOTRIN) 600 MG tablet    Constipation, unspecified constipation type        Relevant Medications    docusate sodium (COLACE) 100 MG capsule          Type 2 diabetes mellitus without complication, without long-term current use of insulin  -     Hemoglobin A1C; Future; Expected date: 07/19/2023  -     glipiZIDE (GLUCOTROL) 2.5 MG TR24; Take 1 tablet (2.5 mg total) by mouth daily with breakfast.  Dispense: 90 tablet; Refill: 1  -     metFORMIN (GLUCOPHAGE) 1000 MG tablet; Take 1 tablet (1,000 mg total) by mouth 2 (two) times daily with meals.  Dispense: 180 tablet; Refill: 1    Essential hypertension  -     Comprehensive Metabolic Panel; Future; Expected date: 07/19/2023  -     CBC Auto Differential; Future; Expected date: 07/19/2023  -     amLODIPine (NORVASC) 2.5 MG tablet; Take 1 tablet (2.5 mg total) by mouth once daily.  Dispense: 90 tablet; Refill: 1  -     atenoloL-chlorthalidone (TENORETIC) 100-25 mg per tablet; Take 1 tablet by mouth once daily.  Dispense: 90 tablet; Refill: 1    Hyperlipidemia, unspecified hyperlipidemia type  -     Lipid Panel; Future; Expected date: 07/19/2023  -     CK; Future; Expected date: 07/19/2023  -     atorvastatin (LIPITOR) 40 MG tablet; Take 1 tablet (40 mg total) by mouth once daily.  Dispense: 90 tablet; Refill: 1    Gastroesophageal reflux disease, unspecified whether esophagitis present  -     omeprazole (PRILOSEC) 40 MG capsule; Take 1 capsule (40 mg total) by mouth every morning.  Dispense: 90 capsule; Refill: 1    Ischemia  -     aspirin (ASPIR-81) 81 MG EC tablet; Take 1 tablet (81 mg total) by mouth once daily.  Dispense: 90 tablet; Refill: 1  -     ibuprofen (ADVIL,MOTRIN) 600 MG tablet; Take 1 tablet (600 mg total) by mouth 3  (three) times daily as needed for Pain.  Dispense: 270 tablet; Refill: 1    Other chronic pain  -     ibuprofen (ADVIL,MOTRIN) 600 MG tablet; Take 1 tablet (600 mg total) by mouth 3 (three) times daily as needed for Pain.  Dispense: 270 tablet; Refill: 1    Constipation, unspecified constipation type  -     docusate sodium (COLACE) 100 MG capsule; Take 1 capsule (100 mg total) by mouth 2 (two) times daily. Listed as 2 caps daily  Dispense: 180 capsule; Refill: 1    Neuropathy  -     amitriptyline (ELAVIL) 75 MG tablet; Take 2 tablets (150 mg total) by mouth every evening. 75 mg: tab 2 at HS (150 mg @ HS)  Dispense: 180 tablet; Refill: 1    Anemia, unspecified type  -     ferrous sulfate (FEOSOL) 325 mg (65 mg iron) Tab tablet; Take 1 tablet (325 mg total) by mouth daily with breakfast.  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       Health Maintenance Topics with due status: Not Due       Topic Last Completion Date    Colorectal Cancer Screening 05/31/2022    Lipid Panel 12/15/2022    Influenza Vaccine 02/07/2023    Mammogram 02/22/2023    Diabetes Urine Screening 04/20/2023    Foot Exam 04/20/2023    LDCT Lung Screen 05/15/2023    Low Dose Statin 07/19/2023         Future Appointments   Date Time Provider Department Center   10/24/2023  8[facility]   4/3/2024  3:30 PM AWV NURSE [facility]        Follow up in about 3 months (around 10/19/2023).    Health Maintenance Due   Topic Date Due    Cervical Cancer Screening  Never done    COVID-19 Vaccine (1) Never done    Pneumococcal Vaccines (Age 0-64) (1 - PCV) Never done    TETANUS VACCINE  Never done    Shingles Vaccine (1 of 2) Never done    Eye Exam  03/22/2023    Hemoglobin A1c  06/15/2023        Signature:  MIKE Pedraza    Date of encounter: 7/19/23

## 2023-07-19 NOTE — ASSESSMENT & PLAN NOTE
Pt reports blood pressure controlled at home please bring log next office visit  - work on diet, specifically cutting back bread, breaded things, cereal, pasta, potatoes, rice  - try to exercise at least 150min a week divided however you want  - if you can do weight, even very light weight do them  - try not to use to much salt, if any, remember that things that are preserved or frozen often contain large amounts of salt as a preserve

## 2023-07-20 ENCOUNTER — PATIENT OUTREACH (OUTPATIENT)
Dept: ADMINISTRATIVE | Facility: HOSPITAL | Age: 63
End: 2023-07-20

## 2023-07-20 NOTE — PROGRESS NOTES
Health maintenance record review for population health care gaps    07/20/2023   --Chart accessed for:chart review  --Care Gaps addressed:cervical cancer screen labs vaccines  Outreach made to patient via  . (Success) (Left Message) (Unavailable)   Patient stated   Care Everywhere updates requested and reviewed.  Media reports reviewed.  [facility] and [facility] reviewed.  Immunization Database (Immprint/MIXX) reviewed. Vaccinations uploaded:   updated with external  Report  Requested recent pap lab work and vaccines records from [facility]  Next appointment  . Appointment notes updated to include:   Health Maintenance Due   Topic Date Due    Cervical Cancer Screening  Never done    COVID-19 Vaccine (1) Never done    Pneumococcal Vaccines (Age 0-64) (1 - PCV) Never done    TETANUS VACCINE  Never done    Shingles Vaccine (1 of 2) Never done    Eye Exam  03/22/2023

## 2023-07-20 NOTE — LETTER
AUTHORIZATION FOR RELEASE OF   CONFIDENTIAL INFORMATION    Dear [facility],    We are seeing Genia Madison, date of birth 1960, in the clinic at [facility]. MIKE Pedraza is the patient's PCP. Genia Madison has an outstanding lab/procedure at the time we reviewed her chart. In order to help keep her health information updated, she has authorized us to request the following medical record(s):        (  )  MAMMOGRAM                                      (  )  COLONOSCOPY      ( X )  PAP SMEAR                                          ( X )  OUTSIDE LAB RESULTS     (  )  DEXA SCAN                                          (  )  EYE EXAM            (  )  FOOT EXAM                                          (  )  ENTIRE RECORD     X  )  OUTSIDE IMMUNIZATIONS                 (  )  _______________         Please fax records to [facility], Mary Elizabeth Spreafico, FNP, [phone]     If you have any questions, please contact Emily Leong Lpn-Care Coordinator at [phone]          Patient Name: Genia Madison  : 1960  Patient Phone #: [phone]

## 2023-07-20 NOTE — PROGRESS NOTES
Lipids controlled BUN Cr elevated make sure blood pressure controlled avoid NSAIDS, A1C controlled anemia noted continue iron supplement repeat labs 3 months

## 2023-07-25 ENCOUNTER — PATIENT OUTREACH (OUTPATIENT)
Dept: ADMINISTRATIVE | Facility: HOSPITAL | Age: 63
End: 2023-07-25

## 2023-07-25 NOTE — PROGRESS NOTES
Health maintenance record review for population health care gaps    Attached the cervical cancer screen letter that was mailed to the patient             July 25, 2023       Genia Madison  [address]  [place] [address]         Dear Genia:    Your [facility] Care Team is dedicated to helping you stay healthy with regularly scheduled recommended screenings.  Scheduling routine screenings is important to maintaining good health. Our records indicate that you may be overdue for your screening pap smear. A pap smear screening can help identify patients at risk for developing cervical cancer at an early stage, when it is most likely to be successfully treated.    We encourage you to schedule your appointment with your [facility] health provider or some primary care providers also perform this screening.    If you have completed or scheduled your pap smear screening outside of [facility], please notify your primary care team so we can update your health record.      If you have questions or would like to schedule your screening, please contact your primary care clinic.    Sincerely,    MIKE Pedraza and your [facility] Primary Care Team  Emily Leong lpn-Clinic Care Coordinator  # [phone]    07/25/2023   --Chart accessed for:chart review  --Care Gaps addressed:cervical cancer screen  Outreach made to patient via mail . (Success) (Left Message) (Unavailable)   Patient portal not activated  Care Everywhere updates requested and reviewed.  Media reports reviewed.  [facility] and [facility] reviewed.  Immunization Database (Immprint/MIXX) reviewed. Vaccinations uploaded:   updated  Health Maintenance Due   Topic Date Due    Cervical Cancer Screening  Never done    COVID-19 Vaccine (1) Never done    Pneumococcal Vaccines (Age 0-64) (1 - PCV) Never done    TETANUS VACCINE  Never done    Shingles Vaccine (1 of 2) Never done    Eye Exam  03/22/2023

## 2023-08-30 ENCOUNTER — OFFICE VISIT (OUTPATIENT)
Dept: FAMILY MEDICINE | Facility: CLINIC | Age: 63
End: 2023-08-30
Payer: MEDICARE

## 2023-08-30 VITALS
WEIGHT: 203 LBS | HEART RATE: 70 BPM | HEIGHT: 65 IN | TEMPERATURE: 99 F | DIASTOLIC BLOOD PRESSURE: 72 MMHG | BODY MASS INDEX: 33.82 KG/M2 | OXYGEN SATURATION: 92 % | SYSTOLIC BLOOD PRESSURE: 112 MMHG | RESPIRATION RATE: 18 BRPM

## 2023-08-30 DIAGNOSIS — Z92.89 HISTORY OF RECENT HOSPITALIZATION: Primary | ICD-10-CM

## 2023-08-30 DIAGNOSIS — Z09 FOLLOW-UP EXAMINATION: ICD-10-CM

## 2023-08-30 PROBLEM — Z76.89 ESTABLISHING CARE WITH NEW DOCTOR, ENCOUNTER FOR: Status: ACTIVE | Noted: 2023-08-30

## 2023-08-30 PROCEDURE — 3074F SYST BP LT 130 MM HG: CPT | Mod: ,,,

## 2023-08-30 PROCEDURE — 3061F PR NEG MICROALBUMINURIA RESULT DOCUMENTED/REVIEW: ICD-10-PCS | Mod: ,,,

## 2023-08-30 PROCEDURE — 99212 PR OFFICE/OUTPT VISIT, EST, LEVL II, 10-19 MIN: ICD-10-PCS | Mod: ,,,

## 2023-08-30 PROCEDURE — 1159F MED LIST DOCD IN RCRD: CPT | Mod: ,,,

## 2023-08-30 PROCEDURE — 1160F PR REVIEW ALL MEDS BY PRESCRIBER/CLIN PHARMACIST DOCUMENTED: ICD-10-PCS | Mod: ,,,

## 2023-08-30 PROCEDURE — 3044F PR MOST RECENT HEMOGLOBIN A1C LEVEL <7.0%: ICD-10-PCS | Mod: ,,,

## 2023-08-30 PROCEDURE — 3008F BODY MASS INDEX DOCD: CPT | Mod: ,,,

## 2023-08-30 PROCEDURE — 3066F PR DOCUMENTATION OF TREATMENT FOR NEPHROPATHY: ICD-10-PCS | Mod: ,,,

## 2023-08-30 PROCEDURE — 3078F PR MOST RECENT DIASTOLIC BLOOD PRESSURE < 80 MM HG: ICD-10-PCS | Mod: ,,,

## 2023-08-30 PROCEDURE — 3066F NEPHROPATHY DOC TX: CPT | Mod: ,,,

## 2023-08-30 PROCEDURE — 3008F PR BODY MASS INDEX (BMI) DOCUMENTED: ICD-10-PCS | Mod: ,,,

## 2023-08-30 PROCEDURE — 1160F RVW MEDS BY RX/DR IN RCRD: CPT | Mod: ,,,

## 2023-08-30 PROCEDURE — 3044F HG A1C LEVEL LT 7.0%: CPT | Mod: ,,,

## 2023-08-30 PROCEDURE — 3074F PR MOST RECENT SYSTOLIC BLOOD PRESSURE < 130 MM HG: ICD-10-PCS | Mod: ,,,

## 2023-08-30 PROCEDURE — 99212 OFFICE O/P EST SF 10 MIN: CPT | Mod: ,,,

## 2023-08-30 PROCEDURE — 3061F NEG MICROALBUMINURIA REV: CPT | Mod: ,,,

## 2023-08-30 PROCEDURE — 1159F PR MEDICATION LIST DOCUMENTED IN MEDICAL RECORD: ICD-10-PCS | Mod: ,,,

## 2023-08-30 PROCEDURE — 3078F DIAST BP <80 MM HG: CPT | Mod: ,,,

## 2023-08-30 NOTE — PROGRESS NOTES
Subjective     Patient ID: Genai Madison is a 62 y.o. female.    Chief Complaint: Establish Care (Here to establish care. )    Took sister to have surgery and she passed out in the elevator. Denies sob but her legs were aching and was very lightheaded. Ran test and did blood work and did not find anything wrong. Pt is here to establish care today with a PCP. Pt is wanting portable oxygen to take with her outside her home. She does have home O2 at this time. Was to be seen 14 days post hospitalization but she was discharged 8/11/2023 from [facility] past her 14 day follow up window.      Review of Systems   Constitutional:  Positive for activity change. Negative for appetite change, fatigue and fever.   HENT:  Negative for trouble swallowing.    Eyes:  Negative for visual disturbance.   Respiratory:  Negative for chest tightness and shortness of breath.    Cardiovascular:  Negative for chest pain, palpitations and leg swelling.   Gastrointestinal:  Negative for change in bowel habit and change in bowel habit.   Endocrine: Negative for polydipsia, polyphagia and polyuria.   Genitourinary:  Negative for bladder incontinence, difficulty urinating and dysuria.   Musculoskeletal:  Negative for myalgias and neck pain.   Integumentary:  Negative for rash and wound.   Neurological:  Negative for headaches.   Psychiatric/Behavioral:  The patient is not nervous/anxious.           Objective     Physical Exam  Vitals and nursing note reviewed.   Constitutional:       Appearance: Normal appearance. She is obese. She is not ill-appearing.   HENT:      Head: Normocephalic.   Eyes:      Extraocular Movements: Extraocular movements intact.      Conjunctiva/sclera: Conjunctivae normal.      Pupils: Pupils are equal, round, and reactive to light.   Cardiovascular:      Rate and Rhythm: Normal rate. Rhythm irregular.      Pulses: Normal pulses.      Heart sounds: Normal heart sounds.   Pulmonary:      Effort: Pulmonary effort is normal. No  respiratory distress.      Breath sounds: Normal breath sounds. No wheezing.   Abdominal:      General: Bowel sounds are normal.      Palpations: Abdomen is soft.      Tenderness: There is no abdominal tenderness.   Musculoskeletal:         General: No tenderness. Normal range of motion.      Cervical back: Normal range of motion and neck supple. No tenderness.      Right lower leg: No edema.      Left lower leg: No edema.   Skin:     General: Skin is warm and dry.      Capillary Refill: Capillary refill takes less than 2 seconds.   Neurological:      General: No focal deficit present.      Mental Status: She is alert and oriented to person, place, and time.   Psychiatric:         Mood and Affect: Mood normal.         Behavior: Behavior normal.         Thought Content: Thought content normal.         Judgment: Judgment normal.            Assessment and Plan     1. History of recent hospitalization  Assessment & Plan:  Pt DC'd from [facility] on 8/11/2023. Will continue medications as currently prescribed and will see in 3 months.      2. Follow-up examination                 Follow up in about 3 months (around 11/30/2023).

## 2023-08-30 NOTE — ASSESSMENT & PLAN NOTE
Pt KURTIS'd from [facility] on 8/11/2023. Will continue medications as currently prescribed and will see in 3 months.

## 2023-09-01 ENCOUNTER — PATIENT OUTREACH (OUTPATIENT)
Dept: ADMINISTRATIVE | Facility: HOSPITAL | Age: 63
End: 2023-09-01

## 2023-09-01 NOTE — PROGRESS NOTES
Health maintenance record review for population health care gaps    Closing care gaps for [employer] insurance.      Health Maintenance Due   Topic Date Due    Cervical Cancer Screening  Never done    COVID-19 Vaccine (1) Never done    Pneumococcal Vaccines (Age 0-64) (1 - PCV) Never done    TETANUS VACCINE  Never done    Low Dose Statin  Never done    Shingles Vaccine (1 of 2) Never done    Eye Exam  03/22/2023    Influenza Vaccine (1) 09/01/2023

## 2023-09-09 DIAGNOSIS — Z71.89 COMPLEX CARE COORDINATION: ICD-10-CM

## 2023-10-24 ENCOUNTER — OFFICE VISIT (OUTPATIENT)
Dept: FAMILY MEDICINE | Facility: CLINIC | Age: 63
End: 2023-10-24
Payer: MEDICARE

## 2023-10-24 VITALS
SYSTOLIC BLOOD PRESSURE: 132 MMHG | RESPIRATION RATE: 20 BRPM | HEIGHT: 65 IN | OXYGEN SATURATION: 94 % | WEIGHT: 198.63 LBS | BODY MASS INDEX: 33.09 KG/M2 | TEMPERATURE: 99 F | HEART RATE: 78 BPM | DIASTOLIC BLOOD PRESSURE: 84 MMHG

## 2023-10-24 DIAGNOSIS — K21.9 GASTROESOPHAGEAL REFLUX DISEASE, UNSPECIFIED WHETHER ESOPHAGITIS PRESENT: ICD-10-CM

## 2023-10-24 DIAGNOSIS — K59.00 CONSTIPATION, UNSPECIFIED CONSTIPATION TYPE: ICD-10-CM

## 2023-10-24 DIAGNOSIS — I28.8 ENLARGED PULMONARY ARTERY: Primary | ICD-10-CM

## 2023-10-24 DIAGNOSIS — G62.9 NEUROPATHY: ICD-10-CM

## 2023-10-24 DIAGNOSIS — I10 ESSENTIAL HYPERTENSION: ICD-10-CM

## 2023-10-24 DIAGNOSIS — E11.9 TYPE 2 DIABETES MELLITUS WITHOUT COMPLICATION, WITHOUT LONG-TERM CURRENT USE OF INSULIN: ICD-10-CM

## 2023-10-24 PROCEDURE — 3066F NEPHROPATHY DOC TX: CPT | Mod: ,,,

## 2023-10-24 PROCEDURE — 1159F MED LIST DOCD IN RCRD: CPT | Mod: ,,,

## 2023-10-24 PROCEDURE — 3061F NEG MICROALBUMINURIA REV: CPT | Mod: ,,,

## 2023-10-24 PROCEDURE — 3008F BODY MASS INDEX DOCD: CPT | Mod: ,,,

## 2023-10-24 PROCEDURE — 1159F PR MEDICATION LIST DOCUMENTED IN MEDICAL RECORD: ICD-10-PCS | Mod: ,,,

## 2023-10-24 PROCEDURE — 3079F PR MOST RECENT DIASTOLIC BLOOD PRESSURE 80-89 MM HG: ICD-10-PCS | Mod: ,,,

## 2023-10-24 PROCEDURE — 3075F SYST BP GE 130 - 139MM HG: CPT | Mod: ,,,

## 2023-10-24 PROCEDURE — 3061F PR NEG MICROALBUMINURIA RESULT DOCUMENTED/REVIEW: ICD-10-PCS | Mod: ,,,

## 2023-10-24 PROCEDURE — 3075F PR MOST RECENT SYSTOLIC BLOOD PRESS GE 130-139MM HG: ICD-10-PCS | Mod: ,,,

## 2023-10-24 PROCEDURE — 99214 PR OFFICE/OUTPT VISIT, EST, LEVL IV, 30-39 MIN: ICD-10-PCS | Mod: ,,,

## 2023-10-24 PROCEDURE — 3066F PR DOCUMENTATION OF TREATMENT FOR NEPHROPATHY: ICD-10-PCS | Mod: ,,,

## 2023-10-24 PROCEDURE — 3044F PR MOST RECENT HEMOGLOBIN A1C LEVEL <7.0%: ICD-10-PCS | Mod: ,,,

## 2023-10-24 PROCEDURE — 3008F PR BODY MASS INDEX (BMI) DOCUMENTED: ICD-10-PCS | Mod: ,,,

## 2023-10-24 PROCEDURE — 3079F DIAST BP 80-89 MM HG: CPT | Mod: ,,,

## 2023-10-24 PROCEDURE — 3044F HG A1C LEVEL LT 7.0%: CPT | Mod: ,,,

## 2023-10-24 PROCEDURE — 1160F RVW MEDS BY RX/DR IN RCRD: CPT | Mod: ,,,

## 2023-10-24 PROCEDURE — 1160F PR REVIEW ALL MEDS BY PRESCRIBER/CLIN PHARMACIST DOCUMENTED: ICD-10-PCS | Mod: ,,,

## 2023-10-24 PROCEDURE — 99214 OFFICE O/P EST MOD 30 MIN: CPT | Mod: ,,,

## 2023-10-24 RX ORDER — METFORMIN HYDROCHLORIDE 1000 MG/1
1000 TABLET ORAL 2 TIMES DAILY WITH MEALS
Qty: 180 TABLET | Refills: 1 | Status: SHIPPED | OUTPATIENT
Start: 2023-10-24

## 2023-10-24 RX ORDER — SPIRONOLACTONE 50 MG/1
50 TABLET, FILM COATED ORAL
COMMUNITY
Start: 2023-10-18 | End: 2024-01-19 | Stop reason: ALTCHOICE

## 2023-10-24 RX ORDER — ASPIRIN 81 MG/1
81 TABLET ORAL DAILY
Qty: 90 TABLET | Refills: 1 | Status: SHIPPED | OUTPATIENT
Start: 2023-10-24

## 2023-10-24 RX ORDER — BLOOD SUGAR DIAGNOSTIC
1 STRIP MISCELLANEOUS 2 TIMES DAILY
Qty: 100 EACH | Refills: 2 | Status: SHIPPED | OUTPATIENT
Start: 2023-10-24 | End: 2024-01-08

## 2023-10-24 RX ORDER — AMITRIPTYLINE HYDROCHLORIDE 75 MG/1
150 TABLET ORAL NIGHTLY
Qty: 180 TABLET | Refills: 1 | Status: SHIPPED | OUTPATIENT
Start: 2023-10-24

## 2023-10-24 RX ORDER — ATORVASTATIN CALCIUM 40 MG/1
40 TABLET, FILM COATED ORAL
COMMUNITY
Start: 2023-10-11 | End: 2023-10-24 | Stop reason: ALTCHOICE

## 2023-10-24 RX ORDER — ATENOLOL AND CHLORTHALIDONE TABLET 100; 25 MG/1; MG/1
1 TABLET ORAL DAILY
Qty: 90 TABLET | Refills: 1 | Status: SHIPPED | OUTPATIENT
Start: 2023-10-24 | End: 2024-01-19 | Stop reason: ALTCHOICE

## 2023-10-24 RX ORDER — FUROSEMIDE 40 MG/1
40 TABLET ORAL
COMMUNITY
Start: 2023-10-18 | End: 2024-02-14 | Stop reason: ALTCHOICE

## 2023-10-24 RX ORDER — AMLODIPINE BESYLATE 2.5 MG/1
2.5 TABLET ORAL
COMMUNITY
Start: 2023-10-11 | End: 2023-10-24 | Stop reason: ALTCHOICE

## 2023-10-24 RX ORDER — GLIPIZIDE 2.5 MG/1
2.5 TABLET, EXTENDED RELEASE ORAL
Qty: 90 TABLET | Refills: 1 | Status: SHIPPED | OUTPATIENT
Start: 2023-10-24

## 2023-10-24 RX ORDER — ASPIRIN 81 MG/1
81 TABLET ORAL DAILY
COMMUNITY
End: 2023-10-24 | Stop reason: SDUPTHER

## 2023-10-24 RX ORDER — LANCETS
1 EACH MISCELLANEOUS 2 TIMES DAILY
Qty: 100 EACH | Refills: 2 | Status: SHIPPED | OUTPATIENT
Start: 2023-10-24

## 2023-10-24 RX ORDER — DOCUSATE SODIUM 100 MG/1
100 CAPSULE, LIQUID FILLED ORAL 2 TIMES DAILY
Qty: 180 CAPSULE | Refills: 1 | Status: SHIPPED | OUTPATIENT
Start: 2023-10-24 | End: 2024-01-19 | Stop reason: DRUGHIGH

## 2023-10-24 RX ORDER — OMEPRAZOLE 40 MG/1
40 CAPSULE, DELAYED RELEASE ORAL EVERY MORNING
Qty: 90 CAPSULE | Refills: 1 | Status: SHIPPED | OUTPATIENT
Start: 2023-10-24

## 2023-10-24 NOTE — PROGRESS NOTES
Subjective     Patient ID: Genia Madison is a 62 y.o. female.    Chief Complaint: 6 month follow up and Medication Refill      Pt here needing a home concentrator for her. She has a dx of enlarged pulmonary artery. Her O2 sat drops into th 80's without oxygen and she only has 2 tanks. She is in desperate need of a concentrator for home and use outside of the home. She has troulble doing her ADL's and has to sit and take deep  breathes to get her oxygen back up. She just had heart cath on 10/17/2023 and was diagnosed with enlarged pulmonary artery. Pt has cardologist and Pulmonologist that she is under there care.    Medication Refill  Pertinent negatives include no arthralgias, change in bowel habit, chest pain, fatigue, headaches, myalgias, rash or weakness.       Review of Systems   Constitutional:  Negative for activity change, appetite change and fatigue.   HENT:  Negative for trouble swallowing.    Eyes:  Negative for pain and visual disturbance.   Respiratory:  Negative for chest tightness and shortness of breath.    Cardiovascular:  Negative for chest pain and palpitations.   Gastrointestinal:  Negative for change in bowel habit.   Genitourinary:  Negative for difficulty urinating and dysuria.   Musculoskeletal:  Negative for arthralgias and myalgias.   Integumentary:  Negative for rash and wound.   Neurological:  Negative for weakness and headaches.   Psychiatric/Behavioral:  The patient is not nervous/anxious.             Objective     Physical Exam  Vitals and nursing note reviewed.   Constitutional:       Appearance: Normal appearance. She is not ill-appearing.   HENT:      Head: Normocephalic and atraumatic.      Right Ear: Tympanic membrane, ear canal and external ear normal.      Left Ear: Tympanic membrane, ear canal and external ear normal.      Nose: Nose normal.      Mouth/Throat:      Mouth: Mucous membranes are moist.      Pharynx: Oropharynx is clear.   Eyes:      Extraocular Movements:  Extraocular movements intact.      Conjunctiva/sclera: Conjunctivae normal.      Pupils: Pupils are equal, round, and reactive to light.   Cardiovascular:      Rate and Rhythm: Normal rate and regular rhythm.      Pulses: Normal pulses.      Heart sounds: Normal heart sounds.   Pulmonary:      Effort: Pulmonary effort is normal. No respiratory distress.      Breath sounds: Normal breath sounds.   Abdominal:      General: Bowel sounds are normal.      Tenderness: There is no abdominal tenderness.   Musculoskeletal:         General: Normal range of motion.      Cervical back: Normal range of motion and neck supple. No tenderness.   Skin:     General: Skin is warm and dry.      Capillary Refill: Capillary refill takes less than 2 seconds.   Neurological:      General: No focal deficit present.      Mental Status: She is alert and oriented to person, place, and time.   Psychiatric:         Mood and Affect: Mood normal.         Behavior: Behavior normal.         Thought Content: Thought content normal.         Judgment: Judgment normal.              Assessment and Plan     1. Enlarged pulmonary artery  Assessment & Plan:  Pt is followed by Pulmonology and Cardiology at [facility]    Orders:  -     OXYGEN FOR HOME USE    2. Neuropathy  Overview:  Last Assessment & Plan:   Formatting of this note might be different from the original.  The current medical regimen is effective;  continue present plan and medications.   Gabapentin as directed    Assessment & Plan:  Neuropathy is controlled. Current medical regimen is effective;   Will adjust according to results and monitor.    Orders:  -     amitriptyline (ELAVIL) 75 MG tablet; Take 2 tablets (150 mg total) by mouth every evening. 75 mg: tab 2 at HS (150 mg @ HS)  Dispense: 180 tablet; Refill: 1    3. Essential hypertension  Overview:  Last Assessment & Plan:   Formatting of this note might be different from the original.  Discussed importance of monitoring BP  Advised to purchase  a BP machine    Blood pressure checks - daily preferred  - The top number should be LESS than 140, but GREATER than 90  - The bottom number should be LESS than 90, but GREATER than 60  - so that is LESS than = 140/90 and GREATER than 90/60    Pulse should be LESS than 100, GREATER than 60    Assessment & Plan:  Blood pressure is controlled. Current medical regimen is effective;   Will adjust according to results and monitor.    Orders:  -     atenoloL-chlorthalidone (TENORETIC) 100-25 mg per tablet; Take 1 tablet by mouth once daily.  Dispense: 90 tablet; Refill: 1  -     Lipid Panel; Future; Expected date: 10/24/2023  -     CBC Auto Differential; Future; Expected date: 10/24/2023  -     Comprehensive Metabolic Panel; Future; Expected date: 10/24/2023  -     Hemoglobin A1C; Future; Expected date: 10/24/2023  -     Microalbumin/Creatinine Ratio, Urine    4. Type 2 diabetes mellitus without complication, without long-term current use of insulin  Overview:  Last Assessment & Plan:   Formatting of this note might be different from the original.  Hgb A1C today    Assessment & Plan:  Diabetes is controlled. Current medical regimen is effective;   Will adjust according to results and monitor.    Orders:  -     glipiZIDE (GLUCOTROL) 2.5 MG TR24; Take 1 tablet (2.5 mg total) by mouth daily with breakfast.  Dispense: 90 tablet; Refill: 1  -     metFORMIN (GLUCOPHAGE) 1000 MG tablet; Take 1 tablet (1,000 mg total) by mouth 2 (two) times daily with meals.  Dispense: 180 tablet; Refill: 1  -     Lipid Panel; Future; Expected date: 10/24/2023  -     CBC Auto Differential; Future; Expected date: 10/24/2023  -     Comprehensive Metabolic Panel; Future; Expected date: 10/24/2023  -     Hemoglobin A1C; Future; Expected date: 10/24/2023  -     Microalbumin/Creatinine Ratio, Urine    5. Gastroesophageal reflux disease, unspecified whether esophagitis present  Assessment & Plan:  GERD is controlled. Current medical regimen is effective;    Will adjust according to results and monitor.    Orders:  -     omeprazole (PRILOSEC) 40 MG capsule; Take 1 capsule (40 mg total) by mouth every morning.  Dispense: 90 capsule; Refill: 1    6. Constipation, unspecified constipation type  Assessment & Plan:  Denies issues with this at this time. Will cont current regiment    Orders:  -     docusate sodium (COLACE) 100 MG capsule; Take 1 capsule (100 mg total) by mouth 2 (two) times daily. Listed as 2 caps daily  Dispense: 180 capsule; Refill: 1    Other orders  -     ACCU-CHEK GUIDE TEST STRIPS Strp; 1 each by Misc.(Non-Drug; Combo Route) route 2 (two) times daily.  Dispense: 100 each; Refill: 2  -     ACCU-CHEK SOFTCLIX LANCETS Misc; 1 each by Misc.(Non-Drug; Combo Route) route 2 (two) times daily.  Dispense: 100 each; Refill: 2  -     aspirin (ECOTRIN) 81 MG EC tablet; Take 1 tablet (81 mg total) by mouth once daily.  Dispense: 90 tablet; Refill: 1               Follow up in about 3 months (around 1/24/2024).

## 2023-10-24 NOTE — PATIENT INSTRUCTIONS
Come back one day this week for you lab fasting.  Work on your cigarette smoking. Set a goal of how many cigarettes you will smoke each day and gradually reduce them weekly.

## 2023-10-24 NOTE — ASSESSMENT & PLAN NOTE
Neuropathy is controlled. Current medical regimen is effective;   Will adjust according to results and monitor.

## 2023-12-04 PROBLEM — Z09 FOLLOW-UP EXAMINATION: Status: RESOLVED | Noted: 2023-08-30 | Resolved: 2023-12-04

## 2023-12-11 ENCOUNTER — OFFICE VISIT (OUTPATIENT)
Dept: FAMILY MEDICINE | Facility: CLINIC | Age: 63
End: 2023-12-11
Payer: MEDICARE

## 2023-12-11 VITALS
WEIGHT: 206 LBS | RESPIRATION RATE: 18 BRPM | BODY MASS INDEX: 34.32 KG/M2 | HEIGHT: 65 IN | DIASTOLIC BLOOD PRESSURE: 87 MMHG | HEART RATE: 66 BPM | SYSTOLIC BLOOD PRESSURE: 132 MMHG | TEMPERATURE: 99 F | OXYGEN SATURATION: 93 %

## 2023-12-11 DIAGNOSIS — I28.8 ENLARGED PULMONARY ARTERY: ICD-10-CM
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
## 2023-12-11 DIAGNOSIS — Z99.81 OXYGEN DEPENDENT: ICD-10-CM

## 2023-12-11 PROCEDURE — 99212 PR OFFICE/OUTPT VISIT, EST, LEVL II, 10-19 MIN: ICD-10-PCS | Mod: ,,,

## 2023-12-11 PROCEDURE — 3066F PR DOCUMENTATION OF TREATMENT FOR NEPHROPATHY: ICD-10-PCS | Mod: ,,,

## 2023-12-11 PROCEDURE — 99212 OFFICE O/P EST SF 10 MIN: CPT | Mod: ,,,

## 2023-12-11 PROCEDURE — 1160F RVW MEDS BY RX/DR IN RCRD: CPT | Mod: ,,,

## 2023-12-11 PROCEDURE — 3008F PR BODY MASS INDEX (BMI) DOCUMENTED: ICD-10-PCS | Mod: ,,,

## 2023-12-11 PROCEDURE — 3075F PR MOST RECENT SYSTOLIC BLOOD PRESS GE 130-139MM HG: ICD-10-PCS | Mod: ,,,

## 2023-12-11 PROCEDURE — 3044F PR MOST RECENT HEMOGLOBIN A1C LEVEL <7.0%: ICD-10-PCS | Mod: ,,,

## 2023-12-11 PROCEDURE — 3008F BODY MASS INDEX DOCD: CPT | Mod: ,,,

## 2023-12-11 PROCEDURE — 1159F PR MEDICATION LIST DOCUMENTED IN MEDICAL RECORD: ICD-10-PCS | Mod: ,,,

## 2023-12-11 PROCEDURE — 3079F DIAST BP 80-89 MM HG: CPT | Mod: ,,,

## 2023-12-11 PROCEDURE — 1159F MED LIST DOCD IN RCRD: CPT | Mod: ,,,

## 2023-12-11 PROCEDURE — 3061F NEG MICROALBUMINURIA REV: CPT | Mod: ,,,

## 2023-12-11 PROCEDURE — 3066F NEPHROPATHY DOC TX: CPT | Mod: ,,,

## 2023-12-11 PROCEDURE — 3061F PR NEG MICROALBUMINURIA RESULT DOCUMENTED/REVIEW: ICD-10-PCS | Mod: ,,,

## 2023-12-11 PROCEDURE — 3075F SYST BP GE 130 - 139MM HG: CPT | Mod: ,,,

## 2023-12-11 PROCEDURE — 3044F HG A1C LEVEL LT 7.0%: CPT | Mod: ,,,

## 2023-12-11 PROCEDURE — 1160F PR REVIEW ALL MEDS BY PRESCRIBER/CLIN PHARMACIST DOCUMENTED: ICD-10-PCS | Mod: ,,,

## 2023-12-11 PROCEDURE — 3079F PR MOST RECENT DIASTOLIC BLOOD PRESSURE 80-89 MM HG: ICD-10-PCS | Mod: ,,,

## 2023-12-11 NOTE — PROGRESS NOTES
Subjective     Patient ID: Genia Madison is a 63 y.o. female.    Chief Complaint: Establish Care (3 MO FOLLOW UP- Patient had labs drawn last week. She has enough meds for three months. ), Health Maintenance (Cervical Cancer Screening Never done-last month at [facility]. /COVID-19 Vaccine(1) Never done-decline/Pneumococcal Vaccines (Age 0-64)(1 - PCV) Never done-declined/TETANUS VACCINE Never done-declined/Low Dose Statin Never done-/Shingles Vaccine(1 of 2) Never done-declined/RSV Vaccine (Age 60+ and Pregnant patients)(1 - 1-dose 60+ series) Never done-declined/Eye Exam due on 03/22/2023 this year with Dr. Emmy Johnson/Influenza Vaccine(1) due on 09/01/2023-decline/Mammogram due on 02/22/2024), and Fall (Patient is having an increase in falls. Gets short of breath when walking long distances. She says that if she keeps walking, she will fall down. She doesn't pass out, she just feels weak and light headed and the falls down. )      Pt has had 2 recent falls since 11/23/2023 the first fall landed her in the shower and broke ribs at this fall. Pt is in need of home health at this time and would like to have [facility] to come as assess her . Pt will benefit from PT and med and lab draws from home.     Fall  The accident occurred More than 1 week ago. The fall occurred while standing. She fell from a height of 3 to 5 ft. She landed on Hard floor. There was no blood loss. Point of impact: broke ribs on right side. Pain location: right ribs. The patient is experiencing no pain. The symptoms are aggravated by standing. Pertinent negatives include no headaches. She has tried nothing for the symptoms. The treatment provided moderate relief.       Review of Systems   Constitutional:  Negative for activity change, appetite change and fatigue.   HENT:  Negative for trouble swallowing.    Eyes:  Negative for pain and visual disturbance.   Respiratory:  Negative for chest tightness and shortness of breath.         Pt  does have some tenderness still to her right rib area   Cardiovascular:  Negative for chest pain and palpitations.   Gastrointestinal:  Negative for change in bowel habit.   Genitourinary:  Negative for difficulty urinating and dysuria.   Musculoskeletal:  Negative for arthralgias and myalgias.   Integumentary:  Negative for rash and wound.   Neurological:  Negative for weakness and headaches.   Psychiatric/Behavioral:  The patient is not nervous/anxious.             Objective     Physical Exam  Vitals and nursing note reviewed.   Constitutional:       Appearance: Normal appearance. She is not ill-appearing.   HENT:      Head: Normocephalic and atraumatic.      Right Ear: Tympanic membrane, ear canal and external ear normal.      Left Ear: Tympanic membrane, ear canal and external ear normal.      Nose: Nose normal.      Mouth/Throat:      Mouth: Mucous membranes are moist.      Pharynx: Oropharynx is clear.   Eyes:      Extraocular Movements: Extraocular movements intact.      Conjunctiva/sclera: Conjunctivae normal.      Pupils: Pupils are equal, round, and reactive to light.   Cardiovascular:      Rate and Rhythm: Normal rate and regular rhythm.      Pulses: Normal pulses.      Heart sounds: Normal heart sounds.   Pulmonary:      Effort: Pulmonary effort is normal. No respiratory distress.      Breath sounds: Normal breath sounds.      Comments: Pain and tenderness to right rib area due to fall  Abdominal:      General: Bowel sounds are normal.      Tenderness: There is no abdominal tenderness.   Musculoskeletal:         General: Normal range of motion.      Cervical back: Normal range of motion and neck supple. No tenderness.   Skin:     General: Skin is warm and dry.      Capillary Refill: Capillary refill takes less than 2 seconds.   Neurological:      General: No focal deficit present.      Mental Status: She is alert and oriented to person, place, and time.   Psychiatric:         Attention and Perception:  Attention and perception normal.         Mood and Affect: Mood normal.         Speech: Speech normal.         Behavior: Behavior normal. Behavior is cooperative.         Thought Content: Thought content normal.         Cognition and Memory: Cognition and memory normal.         Judgment: Judgment normal.      Comments: Pt is very tearful and worried about her health. Will prescribe home health of patient choosing and will order her a rollator so she can sit upon exertion.              Assessment and Plan     1. Fall, subsequent encounter  Assessment & Plan:  Follow up post hospital stay. Pt fell in shower and broke ribs on her right side. Will order home health and walker for home use    Orders:  -     WALKER FOR HOME USE    2. Oxygen dependent  -     WALKER FOR HOME USE    3. Enlarged pulmonary artery  Assessment & Plan:  Will continue to monitor patient and will support medication regimen as prescribed by pulmonology    Orders:  -     WALKER FOR HOME USE               Follow up in about 3 months (around 3/11/2024).

## 2023-12-12 DIAGNOSIS — I27.20 PULMONARY HYPERTENSION: Primary | ICD-10-CM

## 2023-12-12 RX ORDER — FERROUS SULFATE 325(65) MG
325 TABLET ORAL
Qty: 90 TABLET | Refills: 1 | Status: SHIPPED | OUTPATIENT
Start: 2023-12-12
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
## 2023-12-13 NOTE — ASSESSMENT & PLAN NOTE
Follow up post hospital stay. Pt fell in shower and broke ribs on her right side. Will order home health and walker for home use

## 2023-12-27 ENCOUNTER — DOCUMENT SCAN (OUTPATIENT)
Dept: HOME HEALTH SERVICES | Facility: HOSPITAL | Age: 63
End: 2023-12-27
Payer: MEDICARE

## 2024-01-03 ENCOUNTER — OFFICE VISIT (OUTPATIENT)
Dept: FAMILY MEDICINE | Facility: CLINIC | Age: 64
End: 2024-01-03
Payer: MEDICARE

## 2024-01-03 VITALS
HEIGHT: 65 IN | SYSTOLIC BLOOD PRESSURE: 110 MMHG | BODY MASS INDEX: 35.99 KG/M2 | HEART RATE: 76 BPM | TEMPERATURE: 98 F | OXYGEN SATURATION: 93 % | DIASTOLIC BLOOD PRESSURE: 71 MMHG | RESPIRATION RATE: 16 BRPM | WEIGHT: 216 LBS

## 2024-01-03 DIAGNOSIS — J43.9 PULMONARY EMPHYSEMA, UNSPECIFIED EMPHYSEMA TYPE: ICD-10-CM

## 2024-01-03 DIAGNOSIS — R60.9 EDEMA, UNSPECIFIED TYPE: Primary | ICD-10-CM

## 2024-01-03 DIAGNOSIS — R06.02 SOB (SHORTNESS OF BREATH) ON EXERTION: ICD-10-CM

## 2024-01-03 PROCEDURE — 3008F BODY MASS INDEX DOCD: CPT | Mod: ,,,

## 2024-01-03 PROCEDURE — 3078F DIAST BP <80 MM HG: CPT | Mod: ,,,

## 2024-01-03 PROCEDURE — 99213 OFFICE O/P EST LOW 20 MIN: CPT | Mod: ,,,

## 2024-01-03 PROCEDURE — 3074F SYST BP LT 130 MM HG: CPT | Mod: ,,,

## 2024-01-03 PROCEDURE — 1159F MED LIST DOCD IN RCRD: CPT | Mod: ,,,

## 2024-01-03 PROCEDURE — 1160F RVW MEDS BY RX/DR IN RCRD: CPT | Mod: ,,,

## 2024-01-03 RX ORDER — NEBULIZER AND COMPRESSOR
1 EACH MISCELLANEOUS EVERY 6 HOURS PRN
Qty: 1 EACH | Refills: 0 | Status: SHIPPED | OUTPATIENT
Start: 2024-01-03

## 2024-01-03 RX ORDER — IPRATROPIUM BROMIDE AND ALBUTEROL SULFATE 2.5; .5 MG/3ML; MG/3ML
3 SOLUTION RESPIRATORY (INHALATION) EVERY 6 HOURS PRN
Qty: 75 ML | Refills: 8 | Status: SHIPPED | OUTPATIENT
Start: 2024-01-03 | End: 2024-01-04

## 2024-01-03 NOTE — PATIENT INSTRUCTIONS
Pt to take lasix 80 mg (2 tablets) by mouth daily for 3 days and then go back to taking her 40 mg daily return appt for 10 days

## 2024-01-03 NOTE — PROGRESS NOTES
Subjective     Patient ID: Genia Madison is a 63 y.o. female.    Chief Complaint: Edema (Bilateral LLE X2 weeks. 2+ pitting edema. Pt started taking Spirolactione sometime around 212/14/2023. Started back taking her Lasix today. Has been Experiencing SOB for about a month and has been using portable oxygen (diagnosed with emphysema). ) and Health Maintenance (Cervical Cancer Screening Never done-Declined/COVID-19 Vaccine(1) Never done-Declined/Pneumococcal Vaccines (Age 0-64)(1 - PCV) Never done-Declined/TETANUS VACCINE Never done-Declined/Low Dose Statin Never done-Declined/Shingles Vaccine(1 of 2) Never done-Declined/RSV Vaccine (Age 60+ and Pregnant patients)(1 - 1-dose 60+ series) Never done-Declined/Eye Exam due on 03/22/2023-Declined/Influenza Vaccine(1) due on 09/01/2023-Declined/Mammogram due on 02/22/2024- Scheduled/)      Pt has 4+ pitting edema. She has not been taking her Lasix 40 mg she has now started that back today. Pt will continue to take these. Will take 80 mg x 3 days and then go back to taking lasix 40 mg daily. Pt does not have a nebulizer or meds at home, will get the patient set up with these to use PRN SOB    Edema  This is a new problem. The current episode started in the past 7 days. The problem occurs constantly. The problem has been gradually worsening. Associated symptoms include congestion, joint swelling and myalgias. Pertinent negatives include no arthralgias, change in bowel habit, fatigue, headaches, rash or weakness. Nothing aggravates the symptoms. She has tried rest for the symptoms. The treatment provided no relief.       Review of Systems   Constitutional:  Negative for activity change, appetite change and fatigue.   HENT:  Positive for nasal congestion. Negative for trouble swallowing.    Eyes:  Negative for pain and visual disturbance.   Respiratory:  Positive for shortness of breath. Negative for chest tightness.    Cardiovascular:  Positive for leg swelling. Negative for  palpitations.   Gastrointestinal:  Negative for change in bowel habit.   Genitourinary:  Negative for difficulty urinating and dysuria.   Musculoskeletal:  Positive for joint swelling, leg pain and myalgias. Negative for arthralgias.   Integumentary:  Negative for rash and wound.   Neurological:  Negative for weakness and headaches.   Psychiatric/Behavioral:  The patient is not nervous/anxious.             Objective     Physical Exam  Vitals and nursing note reviewed.   Constitutional:       Appearance: Normal appearance. She is not ill-appearing.   HENT:      Head: Normocephalic and atraumatic.      Left Ear: Ear canal normal.      Nose: Nose normal.      Mouth/Throat:      Mouth: Mucous membranes are moist.      Pharynx: Oropharynx is clear.   Eyes:      Extraocular Movements: Extraocular movements intact.      Conjunctiva/sclera: Conjunctivae normal.      Pupils: Pupils are equal, round, and reactive to light.   Cardiovascular:      Rate and Rhythm: Normal rate and regular rhythm.      Pulses: Normal pulses.      Heart sounds: Normal heart sounds.      Comments: Pt very sob per her usual with exertion. She has 4+ pitting edema bilaterally. She did have an echo with her doctor at [facility] yesterday.     Pulmonary:      Effort: Pulmonary effort is normal. No respiratory distress.      Breath sounds: Normal breath sounds.   Abdominal:      General: Bowel sounds are normal.      Tenderness: There is no abdominal tenderness.   Musculoskeletal:         General: Swelling and tenderness present. Normal range of motion.      Cervical back: Normal range of motion and neck supple. No tenderness.      Right lower le+ Edema present.      Left lower le+ Edema present.   Skin:     General: Skin is warm and dry.      Capillary Refill: Capillary refill takes less than 2 seconds.   Neurological:      General: No focal deficit present.      Mental Status: She is alert and oriented to person, place, and time.   Psychiatric:          Mood and Affect: Mood normal.         Behavior: Behavior normal.         Thought Content: Thought content normal.         Judgment: Judgment normal.              Assessment and Plan     1. Edema, unspecified type  Assessment & Plan:  4+ pitting edema to LE's      2. Pulmonary emphysema, unspecified emphysema type  Assessment & Plan:  Pt see pulmonology at [facility]    Orders:  -     nebulizer and compressor Radha; 1 each by Misc.(Non-Drug; Combo Route) route every 6 (six) hours as needed (wheezing, sob).  Dispense: 1 each; Refill: 0  -     Discontinue: albuterol-ipratropium (DUO-NEB) 2.5 mg-0.5 mg/3 mL nebulizer solution; Take 3 mLs by nebulization every 6 (six) hours as needed for Wheezing. Rescue  Dispense: 75 mL; Refill: 8  -     albuterol-ipratropium (DUO-NEB) 2.5 mg-0.5 mg/3 mL nebulizer solution; Take 3 mLs by nebulization every 6 (six) hours as needed for Wheezing. Rescue  Dispense: 75 mL; Refill: 8    3. SOB (shortness of breath) on exertion  Assessment & Plan:  4+ pitting edema.  Lasix 80 mg po daily x 3 days  Then go back to her 40 mg   Fu 10 days    Orders:  -     nebulizer and compressor Radha; 1 each by Misc.(Non-Drug; Combo Route) route every 6 (six) hours as needed (wheezing, sob).  Dispense: 1 each; Refill: 0  -     Discontinue: albuterol-ipratropium (DUO-NEB) 2.5 mg-0.5 mg/3 mL nebulizer solution; Take 3 mLs by nebulization every 6 (six) hours as needed for Wheezing. Rescue  Dispense: 75 mL; Refill: 8  -     albuterol-ipratropium (DUO-NEB) 2.5 mg-0.5 mg/3 mL nebulizer solution; Take 3 mLs by nebulization every 6 (six) hours as needed for Wheezing. Rescue  Dispense: 75 mL; Refill: 8               Follow up in about 10 days (around 1/13/2024).

## 2024-01-04 ENCOUNTER — PATIENT OUTREACH (OUTPATIENT)
Dept: ADMINISTRATIVE | Facility: HOSPITAL | Age: 64
End: 2024-01-04

## 2024-01-04 ENCOUNTER — DOCUMENT SCAN (OUTPATIENT)
Dept: HOME HEALTH SERVICES | Facility: HOSPITAL | Age: 64
End: 2024-01-04
Payer: MEDICARE

## 2024-01-04 PROBLEM — R06.02 SOB (SHORTNESS OF BREATH) ON EXERTION: Status: ACTIVE | Noted: 2024-01-04

## 2024-01-04 RX ORDER — IPRATROPIUM BROMIDE AND ALBUTEROL SULFATE 2.5; .5 MG/3ML; MG/3ML
3 SOLUTION RESPIRATORY (INHALATION) EVERY 6 HOURS PRN
Qty: 75 ML | Refills: 8 | Status: SHIPPED | OUTPATIENT
Start: 2024-01-04 | End: 2025-01-03

## 2024-01-04 NOTE — PROGRESS NOTES
Health maintenance record review for population health care gaps    Noted to appointment note on 1/16/24  10 day follow up*Cervical Cancer Screen Statin Therapy Eye Exam and Vaccines Due for Compliance*

## 2024-01-08 RX ORDER — BLOOD SUGAR DIAGNOSTIC
STRIP MISCELLANEOUS 2 TIMES DAILY
Qty: 200 STRIP | Refills: 3 | Status: SHIPPED | OUTPATIENT
Start: 2024-01-08

## 2024-01-12 ENCOUNTER — EXTERNAL HOME HEALTH (OUTPATIENT)
Dept: HOME HEALTH SERVICES | Facility: HOSPITAL | Age: 64
End: 2024-01-12
Payer: MEDICARE

## 2024-01-19 ENCOUNTER — OFFICE VISIT (OUTPATIENT)
Dept: FAMILY MEDICINE | Facility: CLINIC | Age: 64
End: 2024-01-19
Payer: MEDICARE

## 2024-01-19 VITALS
HEIGHT: 65 IN | OXYGEN SATURATION: 88 % | RESPIRATION RATE: 20 BRPM | WEIGHT: 201 LBS | SYSTOLIC BLOOD PRESSURE: 116 MMHG | BODY MASS INDEX: 33.49 KG/M2 | HEART RATE: 83 BPM | TEMPERATURE: 98 F | DIASTOLIC BLOOD PRESSURE: 80 MMHG

## 2024-01-19 DIAGNOSIS — Z09 FOLLOW-UP EXAMINATION: ICD-10-CM

## 2024-01-19 DIAGNOSIS — R60.9 EDEMA, UNSPECIFIED TYPE: Primary | ICD-10-CM

## 2024-01-19 PROCEDURE — 99212 OFFICE O/P EST SF 10 MIN: CPT | Mod: ,,,

## 2024-01-19 PROCEDURE — 3074F SYST BP LT 130 MM HG: CPT | Mod: ,,,

## 2024-01-19 PROCEDURE — 1160F RVW MEDS BY RX/DR IN RCRD: CPT | Mod: ,,,

## 2024-01-19 PROCEDURE — 3008F BODY MASS INDEX DOCD: CPT | Mod: ,,,

## 2024-01-19 PROCEDURE — 1159F MED LIST DOCD IN RCRD: CPT | Mod: ,,,

## 2024-01-19 PROCEDURE — 3079F DIAST BP 80-89 MM HG: CPT | Mod: ,,,

## 2024-01-19 RX ORDER — TIOTROPIUM BROMIDE AND OLODATEROL 3.124; 2.736 UG/1; UG/1
2 SPRAY, METERED RESPIRATORY (INHALATION) 2 TIMES DAILY PRN
COMMUNITY
Start: 2023-12-14 | End: 2024-02-21

## 2024-01-19 RX ORDER — ALBUTEROL SULFATE 90 UG/1
1 AEROSOL, METERED RESPIRATORY (INHALATION) EVERY 6 HOURS PRN
COMMUNITY

## 2024-01-19 RX ORDER — SILDENAFIL CITRATE 20 MG/1
20 TABLET ORAL 3 TIMES DAILY
COMMUNITY

## 2024-01-19 NOTE — PROGRESS NOTES
Subjective     Patient ID: Genia Madison is a 63 y.o. female.    Chief Complaint: Follow-up (10 day follow up for fluid build up. Pt has lost 15lbs since last dionne./Dr patricia dc'd atenolol 01/12/24. Pt took one pill yesterday and passed out.)      Pt is here for follow up and is feeling much better. She has seen Dr. Patricia and he discontinued her atenolol chorthalidone, colace, IB, sprionalctone. Pt family inst her she could not just stop her BP meds so she took one yesterday and got up to answer the door and fell. Inst pt to not take her BP medicine anymore or any of the meds listed on her AVS from [facility] from Dr. Patricia.  She Is feeling much better and appearance is much better. She still has not received her walker from company.  keeps asking about it, Will have nurses look into this.    Follow-up  This is a chronic problem. The current episode started today. The problem occurs constantly. The problem has been gradually improving. Pertinent negatives include no arthralgias, change in bowel habit, chest pain, coughing, fatigue, headaches, myalgias, rash or weakness. Associated symptoms comments: Sob is much better than last visit. She has been able to get up and do stuff in her home. Nothing aggravates the symptoms. She has tried nothing for the symptoms. The treatment provided moderate relief.       Review of Systems   Constitutional:  Negative for activity change, appetite change and fatigue.   HENT:  Negative for trouble swallowing.    Eyes:  Negative for pain and visual disturbance.   Respiratory:  Negative for cough, chest tightness, shortness of breath and wheezing.    Cardiovascular:  Positive for leg swelling. Negative for chest pain and palpitations.        She does have +1 edema to LE's. She has lost about 15 lbs since last visit. And is feeling much better.    Gastrointestinal:  Negative for change in bowel habit.   Genitourinary:  Negative for difficulty urinating and dysuria.   Musculoskeletal:  Negative  for arthralgias and myalgias.   Integumentary:  Negative for rash and wound.   Neurological:  Negative for weakness and headaches.   Psychiatric/Behavioral:  The patient is not nervous/anxious.             Objective     Physical Exam  Vitals and nursing note reviewed.   Constitutional:       Appearance: Normal appearance. She is not ill-appearing.   HENT:      Head: Normocephalic and atraumatic.      Right Ear: Tympanic membrane, ear canal and external ear normal.      Left Ear: Tympanic membrane, ear canal and external ear normal.      Nose: Nose normal.      Mouth/Throat:      Mouth: Mucous membranes are moist.      Pharynx: Oropharynx is clear.   Eyes:      Extraocular Movements: Extraocular movements intact.      Conjunctiva/sclera: Conjunctivae normal.      Pupils: Pupils are equal, round, and reactive to light.   Cardiovascular:      Rate and Rhythm: Normal rate and regular rhythm.      Pulses: Normal pulses.      Heart sounds: Normal heart sounds.      Comments: LE's swelling is much improved from last visit. Pts face is less swollen today as well. She is feeling much better and looks much better today in appearance  Pulmonary:      Effort: Pulmonary effort is normal. No respiratory distress.      Breath sounds: Normal breath sounds.   Abdominal:      General: Bowel sounds are normal.      Tenderness: There is no abdominal tenderness.   Musculoskeletal:         General: Normal range of motion.      Cervical back: Normal range of motion and neck supple. No tenderness.      Right lower le+ Pitting Edema present.      Left lower le+ Pitting Edema present.   Skin:     General: Skin is warm and dry.   Neurological:      General: No focal deficit present.      Mental Status: She is alert and oriented to person, place, and time.   Psychiatric:         Mood and Affect: Mood normal.         Behavior: Behavior normal.         Thought Content: Thought content normal.         Judgment: Judgment normal.               Assessment and Plan     1. Edema, unspecified type  Assessment & Plan:  Much improved +1 edema to LE's face is less swollen today  Dr. Martinez her pulmonologist has stopped her Atenolol-chlorithaldone, IB, Sprinolactone, and colace.      2. Follow-up examination  Assessment & Plan:  Much improved from last visit. Pt still has not gotten her walker.  The order was place on 12/11/2023. Will get nurse to call today to see what is the issue with getting her walker.                 No follow-ups on file.

## 2024-01-19 NOTE — ASSESSMENT & PLAN NOTE
Much improved +1 edema to LE's face is less swollen today  Dr. Martinez her pulmonologist has stopped her Atenolol-chlorithaldone, IB, Sprinolactone, and colace.

## 2024-01-23 ENCOUNTER — DOCUMENT SCAN (OUTPATIENT)
Dept: HOME HEALTH SERVICES | Facility: HOSPITAL | Age: 64
End: 2024-01-23
Payer: MEDICARE

## 2024-02-06 ENCOUNTER — OFFICE VISIT (OUTPATIENT)
Dept: FAMILY MEDICINE | Facility: CLINIC | Age: 64
End: 2024-02-06
Payer: MEDICARE

## 2024-02-06 VITALS
TEMPERATURE: 99 F | HEIGHT: 65 IN | RESPIRATION RATE: 17 BRPM | OXYGEN SATURATION: 91 % | DIASTOLIC BLOOD PRESSURE: 88 MMHG | SYSTOLIC BLOOD PRESSURE: 128 MMHG | WEIGHT: 203 LBS | BODY MASS INDEX: 33.82 KG/M2 | HEART RATE: 120 BPM

## 2024-02-06 DIAGNOSIS — I27.20 PULMONARY HYPERTENSION: ICD-10-CM

## 2024-02-06 DIAGNOSIS — R00.0 TACHYCARDIA: Primary | ICD-10-CM

## 2024-02-06 DIAGNOSIS — R94.31 ABNORMAL EKG: ICD-10-CM

## 2024-02-06 LAB
EKG 12-LEAD: NORMAL
PR INTERVAL: 151
PRT AXES: NORMAL
QRS DURATION: 82
QT/QTC: NORMAL
VENTRICULAR RATE: 120

## 2024-02-06 PROCEDURE — 3008F BODY MASS INDEX DOCD: CPT | Mod: ,,,

## 2024-02-06 PROCEDURE — 93005 ELECTROCARDIOGRAM TRACING: CPT | Mod: ,,,

## 2024-02-06 PROCEDURE — 3074F SYST BP LT 130 MM HG: CPT | Mod: ,,,

## 2024-02-06 PROCEDURE — 3079F DIAST BP 80-89 MM HG: CPT | Mod: ,,,

## 2024-02-06 PROCEDURE — 1159F MED LIST DOCD IN RCRD: CPT | Mod: ,,,

## 2024-02-06 PROCEDURE — 1160F RVW MEDS BY RX/DR IN RCRD: CPT | Mod: ,,,

## 2024-02-06 PROCEDURE — 99213 OFFICE O/P EST LOW 20 MIN: CPT | Mod: 25,,,

## 2024-02-06 PROCEDURE — 93010 ELECTROCARDIOGRAM REPORT: CPT | Mod: ,,,

## 2024-02-06 RX ORDER — DILTIAZEM HYDROCHLORIDE 120 MG/1
120 CAPSULE, EXTENDED RELEASE ORAL DAILY
Qty: 15 CAPSULE | Refills: 0 | Status: SHIPPED | OUTPATIENT
Start: 2024-02-06 | End: 2024-02-14

## 2024-02-06 NOTE — PROGRESS NOTES
Subjective     Patient ID: Genia Madison is a 63 y.o. female.    Chief Complaint: Tachycardia (High heart rate X1 week. 135 on 02/03/2024, 130 on 02/05/2024, 107 on 02/06/2024. HR is 120 in the clinic today. Patient reports she has been experiencing anxiety mostly in the morning but also in the afternoon before checking hr daily. No known new anxiety triggers.) and Health Maintenance (Cervical Cancer Screening Never done/COVID-19 Vaccine(1) Never done/Pneumococcal Vaccines (Age 0-64)(1 of 2 - PCV) Never done-Declined/TETANUS VACCINE Never done-Declined/Low Dose Statin Never done-Declined/Shingles Vaccine(1 of 2) Never done-Declined/RSV Vaccine (Age 60+ and Pregnant patients)(1 - 1-dose 60+ series) Never done-Declined/Eye Exam due on 03/22/2023- ([facility] 2023)/Influenza Vaccine(1) due on 09/01/2023-Declined/Mammogram due on 02/22/2024-Scheduled/)      Pt is here today for her elevated heart when she gets up and starts to clean up. Her heart starts racing and then her feet swell states she is going to call her cardiologist. States she feels okay and when she gets exerted that's when her heart rate goes up and then she has to sit down and her heart rate calms back down    I called her pulmonologist office Dr. Martinez and he did her heart cath and she has never seen a cardiologist. Will send referral today daughter is a nurse for [employer] and would like patient to see Dr. Reeves.         Review of Systems   Constitutional:  Negative for activity change, appetite change and fatigue.   HENT:  Negative for trouble swallowing.    Eyes:  Negative for pain and visual disturbance.   Respiratory:  Positive for chest tightness and shortness of breath.    Cardiovascular:  Positive for palpitations. Negative for chest pain.   Gastrointestinal:  Negative for change in bowel habit.   Genitourinary:  Negative for difficulty urinating and dysuria.   Musculoskeletal:  Negative for arthralgias and myalgias.    Integumentary:  Negative for rash and wound.   Neurological:  Negative for weakness and headaches.   Psychiatric/Behavioral:  The patient is not nervous/anxious.             Objective     Physical Exam  Vitals and nursing note reviewed.   Constitutional:       Appearance: Normal appearance. She is not ill-appearing.   HENT:      Head: Normocephalic and atraumatic.      Nose: Nose normal.      Mouth/Throat:      Mouth: Mucous membranes are moist.      Pharynx: Oropharynx is clear.   Eyes:      Extraocular Movements: Extraocular movements intact.      Conjunctiva/sclera: Conjunctivae normal.      Pupils: Pupils are equal, round, and reactive to light.   Cardiovascular:      Rate and Rhythm: Regular rhythm. Tachycardia present.      Pulses: Normal pulses.      Heart sounds: Normal heart sounds.      Comments: Pt began noticing a fast heart rate as did her HH nurse. Heart rate will be 120-130's at rest and will go higher when she is up doing ADL's.   EKG shows sinus tach today past vist 60-70 HR  Pt states she can feel her heart beating fast and makes her more anxious  Pulmonary:      Effort: Pulmonary effort is normal. No respiratory distress.      Breath sounds: Normal breath sounds. No wheezing or rhonchi.   Chest:      Chest wall: No tenderness.   Abdominal:      General: Bowel sounds are normal.      Tenderness: There is no abdominal tenderness.   Musculoskeletal:         General: Normal range of motion.      Cervical back: Normal range of motion and neck supple. No tenderness.   Skin:     General: Skin is warm and dry.      Capillary Refill: Capillary refill takes less than 2 seconds.   Neurological:      General: No focal deficit present.      Mental Status: She is alert and oriented to person, place, and time.   Psychiatric:         Mood and Affect: Mood normal.         Behavior: Behavior normal.         Thought Content: Thought content normal.         Judgment: Judgment normal.              Assessment and  Plan     1. Tachycardia  Assessment & Plan:  EKG shows sinus tach. Will refer to cardiology  Cardizem 120 mg daily    Orders:  -     POCT EKG 12-LEAD (Manually Resulted by Ordering Provider)  -     Ambulatory referral/consult to Cardiology; Future; Expected date: 02/13/2024  -     diltiaZEM (DILACOR XR) 120 MG CDCR; Take 1 capsule (120 mg total) by mouth once daily.  Dispense: 15 capsule; Refill: 0    2. Pulmonary hypertension  Assessment & Plan:  Pt does see Dr. Matrinez at [facility] for this issue. Will continue meds as prescribed. Pt is now using portable concentrator at 2 L NC  Brought paper work in for power company to be filled out for concentrator    Orders:  -     Ambulatory referral/consult to Cardiology; Future; Expected date: 02/13/2024  -     diltiaZEM (DILACOR XR) 120 MG CDCR; Take 1 capsule (120 mg total) by mouth once daily.  Dispense: 15 capsule; Refill: 0    3. Abnormal EKG  Assessment & Plan:  Show sinus tach, this is new to her in past visits she has had HR of 60-70's she states upon doing her normal routine her heart rate gets up higher. She has to sit down now and is unable to do her normal routine. Will start patient on Cardizem 120 mg daily and have pat return in 1 week. Inst pt that if her heart rate stayed 120 or greater and she become more sob than usual she would need to go to ED ASAP                 Follow up in about 4 weeks (around 3/5/2024).

## 2024-02-08 PROBLEM — R00.0 TACHYCARDIA: Status: ACTIVE | Noted: 2024-02-08

## 2024-02-08 PROBLEM — R94.31 ABNORMAL EKG: Status: ACTIVE | Noted: 2024-02-08

## 2024-02-08 PROBLEM — I27.20 PULMONARY HYPERTENSION: Status: ACTIVE | Noted: 2024-02-08

## 2024-02-08 NOTE — ASSESSMENT & PLAN NOTE
Pt does see Dr. Martinez at [facility] for this issue. Will continue meds as prescribed. Pt is now using portable concentrator at 2 L NC  Brought paper work in for power company to be filled out for concentrator

## 2024-02-08 NOTE — ASSESSMENT & PLAN NOTE
Show sinus tach, this is new to her in past visits she has had HR of 60-70's she states upon doing her normal routine her heart rate gets up higher. She has to sit down now and is unable to do her normal routine. Will start patient on Cardizem 120 mg daily and have pat return in 1 week. Inst pt that if her heart rate stayed 120 or greater and she become more sob than usual she would need to go to ED ASAP

## 2024-02-14 ENCOUNTER — OFFICE VISIT (OUTPATIENT)
Dept: CARDIOLOGY | Facility: CLINIC | Age: 64
End: 2024-02-14
Payer: MEDICARE

## 2024-02-14 VITALS
HEART RATE: 108 BPM | DIASTOLIC BLOOD PRESSURE: 60 MMHG | HEIGHT: 65 IN | BODY MASS INDEX: 35.32 KG/M2 | RESPIRATION RATE: 16 BRPM | SYSTOLIC BLOOD PRESSURE: 100 MMHG | WEIGHT: 212 LBS

## 2024-02-14 DIAGNOSIS — I50.810 RVF (RIGHT VENTRICULAR FAILURE): Primary | ICD-10-CM

## 2024-02-14 DIAGNOSIS — I27.20 PULMONARY HYPERTENSION: ICD-10-CM

## 2024-02-14 DIAGNOSIS — I10 ESSENTIAL HYPERTENSION: ICD-10-CM

## 2024-02-14 DIAGNOSIS — I10 ESSENTIAL HYPERTENSION: Primary | ICD-10-CM

## 2024-02-14 DIAGNOSIS — R00.0 TACHYCARDIA: ICD-10-CM

## 2024-02-14 PROCEDURE — 3074F SYST BP LT 130 MM HG: CPT | Mod: CPTII,,, | Performed by: STUDENT IN AN ORGANIZED HEALTH CARE EDUCATION/TRAINING PROGRAM

## 2024-02-14 PROCEDURE — 99215 OFFICE O/P EST HI 40 MIN: CPT | Mod: PBBFAC,25 | Performed by: STUDENT IN AN ORGANIZED HEALTH CARE EDUCATION/TRAINING PROGRAM

## 2024-02-14 PROCEDURE — 93010 ELECTROCARDIOGRAM REPORT: CPT | Mod: S$PBB,,, | Performed by: STUDENT IN AN ORGANIZED HEALTH CARE EDUCATION/TRAINING PROGRAM

## 2024-02-14 PROCEDURE — 3008F BODY MASS INDEX DOCD: CPT | Mod: CPTII,,, | Performed by: STUDENT IN AN ORGANIZED HEALTH CARE EDUCATION/TRAINING PROGRAM

## 2024-02-14 PROCEDURE — 93005 ELECTROCARDIOGRAM TRACING: CPT | Mod: PBBFAC | Performed by: STUDENT IN AN ORGANIZED HEALTH CARE EDUCATION/TRAINING PROGRAM

## 2024-02-14 PROCEDURE — 99205 OFFICE O/P NEW HI 60 MIN: CPT | Mod: S$PBB,,, | Performed by: STUDENT IN AN ORGANIZED HEALTH CARE EDUCATION/TRAINING PROGRAM

## 2024-02-14 PROCEDURE — 1159F MED LIST DOCD IN RCRD: CPT | Mod: CPTII,,, | Performed by: STUDENT IN AN ORGANIZED HEALTH CARE EDUCATION/TRAINING PROGRAM

## 2024-02-14 PROCEDURE — 3078F DIAST BP <80 MM HG: CPT | Mod: CPTII,,, | Performed by: STUDENT IN AN ORGANIZED HEALTH CARE EDUCATION/TRAINING PROGRAM

## 2024-02-14 RX ORDER — TORSEMIDE 20 MG/1
20 TABLET ORAL
Qty: 180 TABLET | Refills: 1 | Status: SHIPPED | OUTPATIENT
Start: 2024-02-14 | End: 2025-02-13

## 2024-02-14 NOTE — PROGRESS NOTES
PCP: Ya Beal NP    Referring Provider:     Subjective:   Genia Madison is a 63 y.o. female with hx of smoker, COPD on home oxygen with pulmonary HTN  who presents for evaluation of tachycardia.     Patient referred from PCP office for tachycardia. She has recently been seen at [facility] for pulmonary HTN s/p ECHO and RHC. The RHC showed elevated filling pressures with severe pulmonary hypertension and severely elevated PVR and low CO (likely 2/2 to RV dysfunction). She has been prescribed viagra 20mg tid and LABA. She is not taking LABA due to cost.     Today, she reports leg swelling, chronic shortness of breath and fatigue.     Fhx:  Shx: smoker, Denies etoh or drug use    EKG - 2/14/24 - Sinus tachycardia with low voltages.     ECHO - @ [facility]  Normal LV size  and function. Estimated EF 50-55%. Normal diastolic function. Septal flattening in systole and diastole to suggest RV volume and pressure overload   Dilated RV with borderline function. Estimated RVSP 85 mm Hg suggestive of pulmonary hypertension.   Severe TR   Right atrial enlargement.   No evidence of right to left to shunt with injection of saline contrast to suggest PFO/ASD.     RHC 10/2023    Severe pre capillary, PH   Elevated right sided filling pressures,   Reduced cardiac output             Lab Results   Component Value Date     12/08/2023    K 3.8 12/08/2023     12/08/2023    CO2 28 12/08/2023    BUN 28 (H) 12/08/2023    CREATININE 1.45 (H) 12/08/2023    CALCIUM 9.4 12/08/2023    ANIONGAP 12 12/08/2023       Lab Results   Component Value Date    CHOL 123 12/08/2023    CHOL 134 07/19/2023     Lab Results   Component Value Date    HDL 30 (L) 12/08/2023    HDL 44 07/19/2023     Lab Results   Component Value Date    LDLCALC 48 12/08/2023    LDLCALC 58 07/19/2023     Lab Results   Component Value Date    TRIG 225 (H) 12/08/2023    TRIG 160 (H) 07/19/2023     Lab Results   Component Value Date    CHOLHDL 4.1 12/08/2023    CHOLHDL 3.0  07/19/2023       Lab Results   Component Value Date    WBC 8.79 12/08/2023    HGB 9.6 (L) 12/08/2023    HCT 34.1 (L) 12/08/2023    MCV 72.2 (L) 12/08/2023     12/08/2023           Current Outpatient Medications:     albuterol (PROVENTIL/VENTOLIN HFA) 90 mcg/actuation inhaler, Inhale 1 puff into the lungs every 6 (six) hours as needed for Shortness of Breath., Disp: , Rfl:     albuterol-ipratropium (DUO-NEB) 2.5 mg-0.5 mg/3 mL nebulizer solution, Take 3 mLs by nebulization every 6 (six) hours as needed for Wheezing. Rescue, Disp: 75 mL, Rfl: 8    amitriptyline (ELAVIL) 75 MG tablet, Take 2 tablets (150 mg total) by mouth every evening. 75 mg: tab 2 at HS (150 mg @ HS), Disp: 180 tablet, Rfl: 1    aspirin (ECOTRIN) 81 MG EC tablet, Take 1 tablet (81 mg total) by mouth once daily., Disp: 90 tablet, Rfl: 1    ferrous sulfate (IRON) 325 mg (65 mg iron) Tab tablet, Take 1 tablet (325 mg total) by mouth daily with breakfast., Disp: 90 tablet, Rfl: 1    glipiZIDE (GLUCOTROL) 2.5 MG TR24, Take 1 tablet (2.5 mg total) by mouth daily with breakfast., Disp: 90 tablet, Rfl: 1    metFORMIN (GLUCOPHAGE) 1000 MG tablet, Take 1 tablet (1,000 mg total) by mouth 2 (two) times daily with meals., Disp: 180 tablet, Rfl: 1    nebulizer and compressor Radha, 1 each by Misc.(Non-Drug; Combo Route) route every 6 (six) hours as needed (wheezing, sob)., Disp: 1 each, Rfl: 0    omeprazole (PRILOSEC) 40 MG capsule, Take 1 capsule (40 mg total) by mouth every morning., Disp: 90 capsule, Rfl: 1    sildenafil (REVATIO) 20 mg Tab, Take 20 mg by mouth 3 (three) times daily., Disp: , Rfl:     ACCU-CHEK GUIDE TEST STRIPS Strp, TEST BLOOD SUGAR TWICE DAILY, Disp: 200 strip, Rfl: 3    ACCU-CHEK SOFTCLIX LANCETS Misc, 1 each by Misc.(Non-Drug; Combo Route) route 2 (two) times daily., Disp: 100 each, Rfl: 2    tiotropium-olodateroL (STIOLTO RESPIMAT) 2.5-2.5 mcg/actuation Mist, Inhale 2 puffs into the lungs 2 (two) times daily as needed., Disp: ,  "Rfl:     torsemide (DEMADEX) 20 MG Tab, Take 1 tablet (20 mg total) by mouth 2 (two) times daily before meals., Disp: 180 tablet, Rfl: 1    walker Misc, 1 each by Misc.(Non-Drug; Combo Route) route continuous., Disp: 1 each, Rfl: 0    Review of Systems   Respiratory:  Positive for shortness of breath. Negative for cough.    Cardiovascular:  Positive for leg swelling. Negative for chest pain, palpitations, orthopnea, claudication and PND.         Objective:   /60   Pulse 108   Resp 16   Ht 5' 5" (1.651 m)   Wt 96.2 kg (212 lb)   LMP  (LMP Unknown)   BMI 35.28 kg/m²     Physical Exam  Constitutional:       Appearance: Normal appearance. She is ill-appearing.   Cardiovascular:      Rate and Rhythm: Regular rhythm. Tachycardia present.      Pulses: Normal pulses.      Heart sounds: Normal heart sounds. No murmur heard.  Pulmonary:      Effort: Pulmonary effort is normal.      Breath sounds: Wheezing present.   Musculoskeletal:         General: No swelling.      Right lower leg: Edema present.      Left lower leg: Edema present.   Neurological:      Mental Status: She is alert and oriented to person, place, and time.           Assessment:     1. RVF (right ventricular failure)        2. Tachycardia  Ambulatory referral/consult to Cardiology      3. Pulmonary hypertension  Ambulatory referral/consult to Cardiology    Ambulatory referral/consult to Pulmonology    Basic Metabolic Panel            Plan:   RVF (right ventricular failure)  RV systolic dysfunction in the setting of pre-capillary pulmonary HTN  Currently with +2 leg swelling  Will stop lasix and switch to torsemide 20mg bid  Check BMP in 1 week   Recent RHC at [facility] with low CO/Ci    Pulmonary hypertension  Severe pre-capillary pulmonary HTN  Following Dr. Martinez at [facility]  Needs a pulmonologist here in [place] - not on LABA due to high cost  - On sildenfil 20mg tid    Tachycardia  Multifactorial - pulmonary HTN, COPD, Anemia, RV failure, albuterol " use  Stop po dilt  Will treat underlying issues

## 2024-02-14 NOTE — ASSESSMENT & PLAN NOTE
RV systolic dysfunction in the setting of pre-capillary pulmonary HTN  Currently with +2 leg swelling  Will stop lasix and switch to torsemide 20mg bid  Check BMP in 1 week   Recent RHC at [facility] with low CO/Ci

## 2024-02-14 NOTE — ASSESSMENT & PLAN NOTE
Multifactorial - pulmonary HTN, COPD, Anemia, RV failure, albuterol use  Stop po dilt  Will treat underlying issues

## 2024-02-14 NOTE — ASSESSMENT & PLAN NOTE
Severe pre-capillary pulmonary HTN  Following Dr. Martinez at [facility]  Needs a pulmonologist here in [place] - not on LABA due to high cost  - On sildenfil 20mg tid

## 2024-02-16 LAB
OHS QRS DURATION: 68 MS
OHS QTC CALCULATION: 460 MS

## 2024-02-21 ENCOUNTER — OFFICE VISIT (OUTPATIENT)
Dept: FAMILY MEDICINE | Facility: CLINIC | Age: 64
End: 2024-02-21
Payer: MEDICARE

## 2024-02-21 VITALS
RESPIRATION RATE: 17 BRPM | WEIGHT: 210 LBS | DIASTOLIC BLOOD PRESSURE: 67 MMHG | OXYGEN SATURATION: 92 % | HEART RATE: 137 BPM | TEMPERATURE: 98 F | SYSTOLIC BLOOD PRESSURE: 100 MMHG | HEIGHT: 65 IN | BODY MASS INDEX: 34.99 KG/M2

## 2024-02-21 DIAGNOSIS — R00.0 TACHYCARDIA: ICD-10-CM

## 2024-02-21 DIAGNOSIS — T78.40XA ALLERGIC REACTION, INITIAL ENCOUNTER: Primary | ICD-10-CM

## 2024-02-21 DIAGNOSIS — I27.20 PULMONARY HYPERTENSION: ICD-10-CM

## 2024-02-21 DIAGNOSIS — R06.02 SOB (SHORTNESS OF BREATH) ON EXERTION: ICD-10-CM

## 2024-02-21 PROCEDURE — 1159F MED LIST DOCD IN RCRD: CPT | Mod: ,,,

## 2024-02-21 PROCEDURE — 3008F BODY MASS INDEX DOCD: CPT | Mod: ,,,

## 2024-02-21 PROCEDURE — 3074F SYST BP LT 130 MM HG: CPT | Mod: ,,,

## 2024-02-21 PROCEDURE — 99213 OFFICE O/P EST LOW 20 MIN: CPT | Mod: ,,,

## 2024-02-21 PROCEDURE — 3078F DIAST BP <80 MM HG: CPT | Mod: ,,,

## 2024-02-21 RX ORDER — SPIRONOLACTONE 50 MG/1
50 TABLET, FILM COATED ORAL
COMMUNITY

## 2024-02-21 RX ORDER — METHYLPREDNISOLONE 4 MG/1
TABLET ORAL
Qty: 21 EACH | Refills: 0 | Status: SHIPPED | OUTPATIENT
Start: 2024-02-21 | End: 2024-03-13

## 2024-02-21 NOTE — PROGRESS NOTES
Subjective     Patient ID: Genia Madison is a 63 y.o. female.    Chief Complaint: Tachycardia (Follow up. Patient seen Dr. Tello on 02/14/2024. He advised patient to  stop lasix and switch to torsemide 20mg bid. Patient was also advised to stop Diltizem. Patient brought bp/hr log (scanned). Patient reports a dry cough. BLLE has decreased but still present.  ) and Medication Problem (Patient states that the STIOLTO RESPIMAT inhaler is too expensive and would like to change med to something more affordable. )      Pt continues to have elevated heart rate. Dr. Bowers dc'd Cardizem at this time. She has not been taking this med and continues to be symptomatic with elevated heart rate. She is unable to do her ADL. She has to sit and rest when she is up doing a task, like brushing her teeth, getting a bath. She is unable to stand to make her food. She is oxygen dependant and continues to get sob with O2 at 2L NC  I did discuss patient with Dr. Denney pat is very symptomatic with elevated heart rate. It has been ranging 120-160's It is worse when she is up trying to brush teeth, hair or bath. Daughter is very concerned and upset that cardiology took her off the cardizem. Dr. Denney would like patient restarted on this med and watch for allergic reaction.   Discussed with patient daughter and pat to restart on Monday after she has some steroids in her system to make sure the allergy is not to the cardizem. Inst daughter to stop if patient begins to show sighs and symptoms of allergic reaction. Daughter is a NP voiced understanding  Pt is having some sort of allergic reaction with swelling in her eyes and itching. Pt has been taking benadryl prn.           Review of Systems   Constitutional:  Negative for activity change, appetite change and fatigue.   HENT:  Negative for trouble swallowing.    Eyes:  Negative for pain and visual disturbance.   Respiratory:  Positive for shortness of breath. Negative for chest  tightness.         Pt unable to afford her stiloto inhaler $400 op,   Cardiovascular:  Positive for palpitations. Negative for chest pain.        Pt hr at home is 120-160's daily and hr increases with ADL's and becomes very sob.   Gastrointestinal:  Negative for change in bowel habit.   Genitourinary:  Negative for difficulty urinating and dysuria.   Musculoskeletal:  Negative for arthralgias and myalgias.   Integumentary:  Negative for rash and wound.   Neurological:  Negative for weakness and headaches.   Psychiatric/Behavioral:  The patient is not nervous/anxious.             Objective     Physical Exam  Vitals and nursing note reviewed.   Constitutional:       Appearance: Normal appearance. She is not ill-appearing.   HENT:      Head: Normocephalic and atraumatic.      Right Ear: Tympanic membrane, ear canal and external ear normal.      Left Ear: Tympanic membrane, ear canal and external ear normal.      Nose: Nose normal.      Mouth/Throat:      Mouth: Mucous membranes are moist.      Pharynx: Oropharynx is clear.   Eyes:      Extraocular Movements: Extraocular movements intact.      Conjunctiva/sclera: Conjunctivae normal.      Pupils: Pupils are equal, round, and reactive to light.      Comments: Swelling and redness to both eyes and itching. Pt is having some sort of allergic reaction. Will add medrol dose pack continue taking benadryl prn   Cardiovascular:      Rate and Rhythm: Regular rhythm. Tachycardia present.      Pulses: Normal pulses.      Heart sounds: Normal heart sounds.      Comments:   Pulmonary:      Effort: Pulmonary effort is normal. No respiratory distress.      Breath sounds: Normal breath sounds.      Comments: Lungs clear to auscultation.  Abdominal:      General: Bowel sounds are normal.      Tenderness: There is no abdominal tenderness.   Musculoskeletal:         General: Normal range of motion.      Cervical back: Normal range of motion and neck supple. No tenderness.   Skin:      General: Skin is warm and dry.      Capillary Refill: Capillary refill takes less than 2 seconds.   Neurological:      General: No focal deficit present.      Mental Status: She is alert and oriented to person, place, and time.   Psychiatric:         Mood and Affect: Mood normal.         Behavior: Behavior normal.         Thought Content: Thought content normal.         Judgment: Judgment normal.              Assessment and Plan     1. Allergic reaction, initial encounter  -     methylPREDNISolone (MEDROL DOSEPACK) 4 mg tablet; use as directed  Dispense: 21 each; Refill: 0    2. Pulmonary hypertension  -     fluticasone-umeclidin-vilanter (TRELEGY ELLIPTA) 200-62.5-25 mcg inhaler; Inhale 1 puff into the lungs once daily.  Dispense: 28 each; Refill: 5  -     diltiaZEM (DILACOR XR) 120 MG CDCR; Take 1 capsule (120 mg total) by mouth once daily.  Dispense: 90 capsule; Refill: 1    3. SOB (shortness of breath) on exertion  -     fluticasone-umeclidin-vilanter (TRELEGY ELLIPTA) 200-62.5-25 mcg inhaler; Inhale 1 puff into the lungs once daily.  Dispense: 28 each; Refill: 5    4. Tachycardia  -     diltiaZEM (DILACOR XR) 120 MG CDCR; Take 1 capsule (120 mg total) by mouth once daily.  Dispense: 90 capsule; Refill: 1               Follow up in about 3 months (around 5/21/2024).

## 2024-02-22 RX ORDER — DILTIAZEM HYDROCHLORIDE 120 MG/1
120 CAPSULE, EXTENDED RELEASE ORAL DAILY
Qty: 90 CAPSULE | Refills: 1 | Status: SHIPPED | OUTPATIENT
Start: 2024-02-22 | End: 2025-02-21

## 2024-03-14 ENCOUNTER — EXTERNAL HOME HEALTH (OUTPATIENT)
Dept: HOME HEALTH SERVICES | Facility: HOSPITAL | Age: 64
End: 2024-03-14
Payer: MEDICARE

## 2024-04-05 ENCOUNTER — DOCUMENT SCAN (OUTPATIENT)
Dept: HOME HEALTH SERVICES | Facility: HOSPITAL | Age: 64
End: 2024-04-05
Payer: MEDICARE

## 2024-04-10 ENCOUNTER — DOCUMENT SCAN (OUTPATIENT)
Dept: HOME HEALTH SERVICES | Facility: HOSPITAL | Age: 64
End: 2024-04-10
Payer: MEDICARE

## 2024-04-11 ENCOUNTER — DOCUMENT SCAN (OUTPATIENT)
Dept: HOME HEALTH SERVICES | Facility: HOSPITAL | Age: 64
End: 2024-04-11
Payer: MEDICARE

## 2024-04-19 NOTE — ADDENDUM NOTE
Addended by: NGHIA ROCKWELL on: 4/19/2024 01:47 PM     Modules accepted: Orders, Level of Service

## 2024-04-22 PROBLEM — Z09 FOLLOW-UP EXAMINATION: Status: RESOLVED | Noted: 2023-08-30 | Resolved: 2024-04-22

## 2024-05-01 ENCOUNTER — DOCUMENT SCAN (OUTPATIENT)
Dept: HOME HEALTH SERVICES | Facility: HOSPITAL | Age: 64
End: 2024-05-01
Payer: MEDICARE

## 2024-05-02 ENCOUNTER — DOCUMENT SCAN (OUTPATIENT)
Dept: HOME HEALTH SERVICES | Facility: HOSPITAL | Age: 64
End: 2024-05-02
Payer: MEDICARE

## 2024-05-08 ENCOUNTER — DOCUMENT SCAN (OUTPATIENT)
Dept: HOME HEALTH SERVICES | Facility: HOSPITAL | Age: 64
End: 2024-05-08
Payer: MEDICARE

## 2024-05-09 NOTE — ADDENDUM NOTE
Addended by: NGHIA ROCKWELL on: 5/9/2024 01:07 PM     Modules accepted: Orders, Level of Service

## 2024-05-23 ENCOUNTER — EXTERNAL HOME HEALTH (OUTPATIENT)
Dept: HOME HEALTH SERVICES | Facility: HOSPITAL | Age: 64
End: 2024-05-23
Payer: MEDICARE

## 2024-06-20 NOTE — ADDENDUM NOTE
Addended by: NGHIA ROCKWELL on: 6/20/2024 03:03 PM     Modules accepted: Orders, Level of Service

## 2024-09-23 ENCOUNTER — HOSPITAL ENCOUNTER (OUTPATIENT)
Dept: RADIOLOGY | Facility: HOSPITAL | Age: 64
Discharge: HOME | End: 2024-09-23
Attending: NURSE PRACTITIONER
Payer: MEDICARE

## 2024-09-23 DIAGNOSIS — E04.1 NONTOXIC SINGLE THYROID NODULE: ICD-10-CM

## 2024-09-23 PROCEDURE — 76536 US EXAM OF HEAD AND NECK: CPT | Mod: 26,,, | Performed by: RADIOLOGY

## 2024-09-23 PROCEDURE — 76536 US EXAM OF HEAD AND NECK: CPT | Mod: TC

## 2024-12-19 DIAGNOSIS — M25.512 BILATERAL SHOULDER PAIN: Primary | ICD-10-CM

## 2024-12-19 DIAGNOSIS — M25.511 BILATERAL SHOULDER PAIN: Primary | ICD-10-CM

## 2025-05-28 ENCOUNTER — INITIAL CONSULT (OUTPATIENT)
Dept: VASCULAR SURGERY | Facility: CLINIC | Age: 65
End: 2025-05-28
Payer: MEDICARE

## 2025-05-28 VITALS — BODY MASS INDEX: 35.01 KG/M2 | WEIGHT: 210.13 LBS | HEIGHT: 65 IN

## 2025-05-28 DIAGNOSIS — C50.511 MALIGNANT NEOPLASM OF LOWER-OUTER QUADRANT OF RIGHT BREAST OF FEMALE, ESTROGEN RECEPTOR POSITIVE: Primary | ICD-10-CM

## 2025-05-28 DIAGNOSIS — Z17.0 MALIGNANT NEOPLASM OF LOWER-OUTER QUADRANT OF RIGHT BREAST OF FEMALE, ESTROGEN RECEPTOR POSITIVE: Primary | ICD-10-CM

## 2025-05-28 PROCEDURE — 99214 OFFICE O/P EST MOD 30 MIN: CPT | Mod: PBBFAC | Performed by: SURGERY

## 2025-05-28 PROCEDURE — 99203 OFFICE O/P NEW LOW 30 MIN: CPT | Mod: S$PBB,,, | Performed by: SURGERY

## 2025-05-28 PROCEDURE — 99999 PR PBB SHADOW E&M-EST. PATIENT-LVL IV: CPT | Mod: PBBFAC,,, | Performed by: SURGERY

## 2025-05-28 RX ORDER — AMBRISENTAN 10 MG/1
10 TABLET, FILM COATED ORAL
COMMUNITY
Start: 2025-03-31

## 2025-05-28 RX ORDER — DIGOXIN 125 MCG
125 TABLET ORAL
COMMUNITY
Start: 2025-01-30

## 2025-05-28 RX ORDER — ASPIRIN 325 MG
325 TABLET, DELAYED RELEASE (ENTERIC COATED) ORAL DAILY
COMMUNITY

## 2025-05-28 RX ORDER — SPIRONOLACTONE 25 MG/1
25 TABLET ORAL DAILY
COMMUNITY

## 2025-05-28 RX ORDER — ATORVASTATIN CALCIUM 40 MG/1
40 TABLET, FILM COATED ORAL
COMMUNITY
Start: 2025-04-16

## 2025-05-28 RX ORDER — LOSARTAN POTASSIUM 25 MG/1
25 TABLET ORAL
COMMUNITY
Start: 2024-07-01

## 2025-05-28 RX ORDER — SILDENAFIL CITRATE 20 MG/1
20 TABLET ORAL 3 TIMES DAILY
COMMUNITY

## 2025-05-28 RX ORDER — EZETIMIBE 10 MG/1
TABLET ORAL
COMMUNITY
Start: 2025-03-23

## 2025-05-28 NOTE — PROGRESS NOTES
"Subjective:       Patient ID: Genia Madison is a 64 y.o. female.    Chief Complaint: Breast Problem   New patient right breast cancer invasive ductal 18 mm by mammography said needle core biopsy ER IL positive HER2/iliana negative.  She has appointment with Dr. [facility] 23rd she will call us after that visit if she desires this to pursue a surgery.  I have personally reviewed Dr. Butler is a notes and I completely agree with his opinion in the options available for treatment.  Patient family members the patient of mine and she states she desires us to address her breast cancer this time told her that would be our pleasure but certainly had complete trust and Dr. Butler  family history includes Alzheimer's disease in her brother and mother; Diabetes in her brother; Heart attack in her brother; Stomach cancer in her father.  Past Medical History:   Diagnosis Date    Abnormal chest CT     1/13/22 [facility]    Cough     bronchitis 11/2021    Diabetes mellitus, type 2     GERD (gastroesophageal reflux disease)     Hyperlipidemia     Hypertension     Hypoxia     O2  2L @ HS    Migraine     LIZET (obstructive sleep apnea)     Seizures     Wheeze       Past Surgical History:   Procedure Laterality Date    BRAIN SURGERY      1/1/2001: [facility]       reports that she has been smoking cigarettes. She started smoking about 47 years ago. She has a 23.5 pack-year smoking history. She has been exposed to tobacco smoke. She has never used smokeless tobacco. She reports that she does not currently use alcohol. She reports that she does not currently use drugs.   HPI  Review of Systems      Objective:      Ht 5' 5" (1.651 m)   Wt 95.3 kg (210 lb 1.6 oz)   LMP  (LMP Unknown)   BMI 34.96 kg/m²    Physical Exam  Constitutional:       Appearance: Normal appearance.   Cardiovascular:      Rate and Rhythm: Normal rate.   Chest:   Breasts:     Right: Normal.      Left: Normal.      Comments:  Did not definitely palpate any " masses associated with this newly diagnosis right breast cancer  Lymphadenopathy:      Upper Body:      Right upper body: No supraclavicular or axillary adenopathy.      Left upper body: No supraclavicular or axillary adenopathy.   Skin:     Capillary Refill: Capillary refill takes less than 2 seconds.   Neurological:      General: No focal deficit present.      Mental Status: She is alert.   Psychiatric:         Mood and Affect: Mood normal.         Behavior: Behavior normal.         Thought Content: Thought content normal.         Judgment: Judgment normal.           Assessment:       1. Malignant neoplasm of lower-outer quadrant of right breast of female, estrogen receptor positive        Plan:        Patient has a long candid discussion to occlude the options of mastectomy breast conservative surgery the requirement of radiation the mortality rates and recurrence rates discussion of possible lymphedema surgical scars infections.  She shops or agrees this time for possible breast conservative surgery she will call us after she visits Dr. Edwards which is June 23rd

## 2025-06-25 ENCOUNTER — TELEPHONE (OUTPATIENT)
Dept: VASCULAR SURGERY | Facility: CLINIC | Age: 65
End: 2025-06-25
Payer: MEDICARE

## 2025-06-25 DIAGNOSIS — Z17.0 MALIGNANT NEOPLASM OF LOWER-OUTER QUADRANT OF RIGHT BREAST OF FEMALE, ESTROGEN RECEPTOR POSITIVE: Primary | ICD-10-CM

## 2025-06-25 DIAGNOSIS — C50.511 MALIGNANT NEOPLASM OF LOWER-OUTER QUADRANT OF RIGHT BREAST OF FEMALE, ESTROGEN RECEPTOR POSITIVE: Primary | ICD-10-CM

## 2025-06-25 NOTE — TELEPHONE ENCOUNTER
Spoke with pt. Pre op instructions discussed with pt.  She states she understands and has no questions.  Phone number given if she does.  Diabetic meds discussed

## 2025-07-14 ENCOUNTER — ANESTHESIA EVENT (OUTPATIENT)
Dept: SURGERY | Facility: HOSPITAL | Age: 65
End: 2025-07-14
Payer: MEDICARE

## 2025-07-15 ENCOUNTER — HOSPITAL ENCOUNTER (OUTPATIENT)
Dept: RADIOLOGY | Facility: HOSPITAL | Age: 65
Discharge: HOME | End: 2025-07-15
Attending: RADIOLOGY | Admitting: SURGERY
Payer: MEDICARE

## 2025-07-15 ENCOUNTER — HOSPITAL ENCOUNTER (OUTPATIENT)
Dept: RADIOLOGY | Facility: HOSPITAL | Age: 65
Discharge: HOME | End: 2025-07-15
Attending: SURGERY | Admitting: SURGERY
Payer: MEDICARE

## 2025-07-15 ENCOUNTER — HOSPITAL ENCOUNTER (OUTPATIENT)
Facility: HOSPITAL | Age: 65
Discharge: HOME | End: 2025-07-15
Attending: SURGERY | Admitting: SURGERY
Payer: MEDICARE

## 2025-07-15 ENCOUNTER — ANESTHESIA (OUTPATIENT)
Dept: SURGERY | Facility: HOSPITAL | Age: 65
End: 2025-07-15
Payer: MEDICARE

## 2025-07-15 VITALS
HEART RATE: 87 BPM | OXYGEN SATURATION: 94 % | RESPIRATION RATE: 16 BRPM | TEMPERATURE: 98 F | DIASTOLIC BLOOD PRESSURE: 77 MMHG | SYSTOLIC BLOOD PRESSURE: 138 MMHG | BODY MASS INDEX: 30.99 KG/M2 | HEIGHT: 65 IN | WEIGHT: 186 LBS

## 2025-07-15 DIAGNOSIS — N63.0 BREAST LUMP: ICD-10-CM

## 2025-07-15 DIAGNOSIS — R92.8 ABNORMAL MAMMOGRAM: ICD-10-CM

## 2025-07-15 DIAGNOSIS — Z17.0 MALIGNANT NEOPLASM OF LOWER-OUTER QUADRANT OF RIGHT BREAST OF FEMALE, ESTROGEN RECEPTOR POSITIVE: Primary | ICD-10-CM

## 2025-07-15 DIAGNOSIS — Z17.0 MALIGNANT NEOPLASM OF LOWER-OUTER QUADRANT OF RIGHT BREAST OF FEMALE, ESTROGEN RECEPTOR POSITIVE: ICD-10-CM

## 2025-07-15 DIAGNOSIS — C50.511 MALIGNANT NEOPLASM OF LOWER-OUTER QUADRANT OF RIGHT BREAST OF FEMALE, ESTROGEN RECEPTOR POSITIVE: ICD-10-CM

## 2025-07-15 DIAGNOSIS — C50.511 MALIGNANT NEOPLASM OF LOWER-OUTER QUADRANT OF RIGHT BREAST OF FEMALE, ESTROGEN RECEPTOR POSITIVE: Primary | ICD-10-CM

## 2025-07-15 LAB
ANION GAP SERPL CALCULATED.3IONS-SCNC: 11 MMOL/L (ref 7–16)
BASOPHILS # BLD AUTO: 0.05 K/UL (ref 0–0.2)
BASOPHILS NFR BLD AUTO: 0.5 % (ref 0–1)
BUN SERPL-MCNC: 22 MG/DL (ref 10–20)
BUN/CREAT SERPL: 15 (ref 6–20)
CALCIUM SERPL-MCNC: 9.4 MG/DL (ref 8.4–10.2)
CHLORIDE SERPL-SCNC: 107 MMOL/L (ref 98–107)
CO2 SERPL-SCNC: 26 MMOL/L (ref 23–31)
CREAT SERPL-MCNC: 1.45 MG/DL (ref 0.55–1.02)
DIFFERENTIAL METHOD BLD: ABNORMAL
EGFR (NO RACE VARIABLE) (RUSH/TITUS): 41 ML/MIN/1.73M2
EOSINOPHIL # BLD AUTO: 0.28 K/UL (ref 0–0.5)
EOSINOPHIL NFR BLD AUTO: 2.8 % (ref 1–4)
ERYTHROCYTE [DISTWIDTH] IN BLOOD BY AUTOMATED COUNT: 15.9 % (ref 11.5–14.5)
GLUCOSE SERPL-MCNC: 112 MG/DL (ref 70–105)
GLUCOSE SERPL-MCNC: 121 MG/DL (ref 82–115)
GLUCOSE SERPL-MCNC: 139 MG/DL (ref 70–105)
HCT VFR BLD AUTO: 42.8 % (ref 38–47)
HGB BLD-MCNC: 13.1 G/DL (ref 12–16)
IMM GRANULOCYTES # BLD AUTO: 0.04 K/UL (ref 0–0.04)
IMM GRANULOCYTES NFR BLD: 0.4 % (ref 0–0.4)
LYMPHOCYTES # BLD AUTO: 2.04 K/UL (ref 1–4.8)
LYMPHOCYTES NFR BLD AUTO: 20.6 % (ref 27–41)
MCH RBC QN AUTO: 24 PG (ref 27–31)
MCHC RBC AUTO-ENTMCNC: 30.6 G/DL (ref 32–36)
MCV RBC AUTO: 78.5 FL (ref 80–96)
MONOCYTES # BLD AUTO: 0.53 K/UL (ref 0–0.8)
MONOCYTES NFR BLD AUTO: 5.3 % (ref 2–6)
MPC BLD CALC-MCNC: 9.1 FL (ref 9.4–12.4)
NEUTROPHILS # BLD AUTO: 6.97 K/UL (ref 1.8–7.7)
NEUTROPHILS NFR BLD AUTO: 70.4 % (ref 53–65)
NRBC # BLD AUTO: 0 X10E3/UL
NRBC, AUTO (.00): 0 %
PLATELET # BLD AUTO: 339 K/UL (ref 150–400)
POTASSIUM SERPL-SCNC: 4.9 MMOL/L (ref 3.5–5.1)
RBC # BLD AUTO: 5.45 M/UL (ref 4.2–5.4)
SODIUM SERPL-SCNC: 139 MMOL/L (ref 136–145)
WBC # BLD AUTO: 9.91 K/UL (ref 4.5–11)

## 2025-07-15 PROCEDURE — 71000016 HC POSTOP RECOV ADDL HR: Performed by: SURGERY

## 2025-07-15 PROCEDURE — 27000510 HC BLANKET BAIR HUGGER ANY SIZE: Performed by: NURSE ANESTHETIST, CERTIFIED REGISTERED

## 2025-07-15 PROCEDURE — 63600175 PHARM REV CODE 636 W HCPCS: Mod: JZ,TB

## 2025-07-15 PROCEDURE — 19285 PERQ DEV BREAST 1ST US IMAG: CPT | Mod: RT,,, | Performed by: RADIOLOGY

## 2025-07-15 PROCEDURE — 77065 DX MAMMO INCL CAD UNI: CPT | Mod: TC,RT

## 2025-07-15 PROCEDURE — 85025 COMPLETE CBC W/AUTO DIFF WBC: CPT | Performed by: SURGERY

## 2025-07-15 PROCEDURE — 25000003 PHARM REV CODE 250: Performed by: SURGERY

## 2025-07-15 PROCEDURE — 19285 PERQ DEV BREAST 1ST US IMAG: CPT

## 2025-07-15 PROCEDURE — 25000242 PHARM REV CODE 250 ALT 637 W/ HCPCS: Performed by: ANESTHESIOLOGY

## 2025-07-15 PROCEDURE — 77065 DX MAMMO INCL CAD UNI: CPT | Mod: 26,RT,, | Performed by: RADIOLOGY

## 2025-07-15 PROCEDURE — 37000008 HC ANESTHESIA 1ST 15 MINUTES: Performed by: SURGERY

## 2025-07-15 PROCEDURE — 36000706: Performed by: SURGERY

## 2025-07-15 PROCEDURE — 88341 IMHCHEM/IMCYTCHM EA ADD ANTB: CPT | Mod: TC,SUR | Performed by: SURGERY

## 2025-07-15 PROCEDURE — 76098 X-RAY EXAM SURGICAL SPECIMEN: CPT | Mod: 26,,, | Performed by: RADIOLOGY

## 2025-07-15 PROCEDURE — 94640 AIRWAY INHALATION TREATMENT: CPT

## 2025-07-15 PROCEDURE — 38525 BIOPSY/REMOVAL LYMPH NODES: CPT | Mod: 51,RT,, | Performed by: SURGERY

## 2025-07-15 PROCEDURE — 38900 IO MAP OF SENT LYMPH NODE: CPT | Mod: RT,,, | Performed by: SURGERY

## 2025-07-15 PROCEDURE — 78195 LYMPH SYSTEM IMAGING: CPT | Mod: 26,,, | Performed by: RADIOLOGY

## 2025-07-15 PROCEDURE — 36000707: Performed by: SURGERY

## 2025-07-15 PROCEDURE — 27201423 OPTIME MED/SURG SUP & DEVICES STERILE SUPPLY: Performed by: SURGERY

## 2025-07-15 PROCEDURE — 71000033 HC RECOVERY, INTIAL HOUR: Performed by: SURGERY

## 2025-07-15 PROCEDURE — 71000015 HC POSTOP RECOV 1ST HR: Performed by: SURGERY

## 2025-07-15 PROCEDURE — 80048 BASIC METABOLIC PNL TOTAL CA: CPT | Performed by: SURGERY

## 2025-07-15 PROCEDURE — 63600175 PHARM REV CODE 636 W HCPCS: Performed by: NURSE ANESTHETIST, CERTIFIED REGISTERED

## 2025-07-15 PROCEDURE — 37000009 HC ANESTHESIA EA ADD 15 MINS: Performed by: SURGERY

## 2025-07-15 PROCEDURE — 88307 TISSUE EXAM BY PATHOLOGIST: CPT | Mod: 26,,, | Performed by: PATHOLOGY

## 2025-07-15 PROCEDURE — 76098 X-RAY EXAM SURGICAL SPECIMEN: CPT | Mod: TC

## 2025-07-15 PROCEDURE — 78195 LYMPH SYSTEM IMAGING: CPT | Mod: TC

## 2025-07-15 PROCEDURE — 36415 COLL VENOUS BLD VENIPUNCTURE: CPT | Performed by: SURGERY

## 2025-07-15 PROCEDURE — C1819 TISSUE LOCALIZATION-EXCISION: HCPCS | Performed by: SURGERY

## 2025-07-15 PROCEDURE — 27000177 HC AIRWAY, LARYNGEAL MASK: Performed by: NURSE ANESTHETIST, CERTIFIED REGISTERED

## 2025-07-15 PROCEDURE — 88342 IMHCHEM/IMCYTCHM 1ST ANTB: CPT | Mod: 26,,, | Performed by: PATHOLOGY

## 2025-07-15 PROCEDURE — A9541 TC99M SULFUR COLLOID: HCPCS | Performed by: SURGERY

## 2025-07-15 PROCEDURE — 88341 IMHCHEM/IMCYTCHM EA ADD ANTB: CPT | Mod: 26,,, | Performed by: PATHOLOGY

## 2025-07-15 PROCEDURE — 27000716 HC OXISENSOR PROBE, ANY SIZE: Performed by: NURSE ANESTHETIST, CERTIFIED REGISTERED

## 2025-07-15 PROCEDURE — 19301 PARTIAL MASTECTOMY: CPT | Mod: RT,,, | Performed by: SURGERY

## 2025-07-15 PROCEDURE — 82962 GLUCOSE BLOOD TEST: CPT

## 2025-07-15 RX ORDER — IPRATROPIUM BROMIDE AND ALBUTEROL SULFATE 2.5; .5 MG/3ML; MG/3ML
3 SOLUTION RESPIRATORY (INHALATION)
Status: COMPLETED | OUTPATIENT
Start: 2025-07-15 | End: 2025-07-15

## 2025-07-15 RX ORDER — SODIUM CHLORIDE 9 MG/ML
INJECTION, SOLUTION INTRAVENOUS CONTINUOUS
Status: DISCONTINUED | OUTPATIENT
Start: 2025-07-15 | End: 2025-07-15 | Stop reason: HOSPADM

## 2025-07-15 RX ORDER — DEXAMETHASONE SODIUM PHOSPHATE 4 MG/ML
INJECTION, SOLUTION INTRA-ARTICULAR; INTRALESIONAL; INTRAMUSCULAR; INTRAVENOUS; SOFT TISSUE
Status: DISCONTINUED | OUTPATIENT
Start: 2025-07-15 | End: 2025-07-15

## 2025-07-15 RX ORDER — DIPHENHYDRAMINE HYDROCHLORIDE 50 MG/ML
25 INJECTION, SOLUTION INTRAMUSCULAR; INTRAVENOUS EVERY 6 HOURS PRN
Status: DISCONTINUED | OUTPATIENT
Start: 2025-07-15 | End: 2025-07-15 | Stop reason: HOSPADM

## 2025-07-15 RX ORDER — HYDROCODONE BITARTRATE AND ACETAMINOPHEN 5; 325 MG/1; MG/1
1 TABLET ORAL EVERY 6 HOURS PRN
Qty: 20 TABLET | Refills: 0 | Status: SHIPPED | OUTPATIENT
Start: 2025-07-15

## 2025-07-15 RX ORDER — FENTANYL CITRATE 50 UG/ML
INJECTION, SOLUTION INTRAMUSCULAR; INTRAVENOUS
Status: DISCONTINUED | OUTPATIENT
Start: 2025-07-15 | End: 2025-07-15

## 2025-07-15 RX ORDER — PROPOFOL 10 MG/ML
VIAL (ML) INTRAVENOUS
Status: DISCONTINUED | OUTPATIENT
Start: 2025-07-15 | End: 2025-07-15

## 2025-07-15 RX ORDER — ONDANSETRON HYDROCHLORIDE 2 MG/ML
4 INJECTION, SOLUTION INTRAVENOUS DAILY PRN
Status: DISCONTINUED | OUTPATIENT
Start: 2025-07-15 | End: 2025-07-15 | Stop reason: HOSPADM

## 2025-07-15 RX ORDER — TECHNETIUM TC 99M SULFUR COLLOID 2 MG
480 KIT MISCELLANEOUS
Status: COMPLETED | OUTPATIENT
Start: 2025-07-15 | End: 2025-07-15

## 2025-07-15 RX ORDER — ONDANSETRON HYDROCHLORIDE 2 MG/ML
INJECTION, SOLUTION INTRAVENOUS
Status: DISCONTINUED | OUTPATIENT
Start: 2025-07-15 | End: 2025-07-15

## 2025-07-15 RX ORDER — LIDOCAINE HYDROCHLORIDE 20 MG/ML
INJECTION, SOLUTION EPIDURAL; INFILTRATION; INTRACAUDAL; PERINEURAL
Status: DISCONTINUED | OUTPATIENT
Start: 2025-07-15 | End: 2025-07-15

## 2025-07-15 RX ORDER — HYDROMORPHONE HYDROCHLORIDE 2 MG/ML
0.5 INJECTION, SOLUTION INTRAMUSCULAR; INTRAVENOUS; SUBCUTANEOUS EVERY 5 MIN PRN
Status: DISCONTINUED | OUTPATIENT
Start: 2025-07-15 | End: 2025-07-15 | Stop reason: HOSPADM

## 2025-07-15 RX ORDER — GLUCAGON 1 MG
1 KIT INJECTION
Status: DISCONTINUED | OUTPATIENT
Start: 2025-07-15 | End: 2025-07-15 | Stop reason: HOSPADM

## 2025-07-15 RX ORDER — MORPHINE SULFATE 10 MG/ML
4 INJECTION INTRAMUSCULAR; INTRAVENOUS; SUBCUTANEOUS EVERY 5 MIN PRN
Status: DISCONTINUED | OUTPATIENT
Start: 2025-07-15 | End: 2025-07-15 | Stop reason: HOSPADM

## 2025-07-15 RX ORDER — CLINDAMYCIN PHOSPHATE 900 MG/50ML
INJECTION, SOLUTION INTRAVENOUS
Status: DISCONTINUED | OUTPATIENT
Start: 2025-07-15 | End: 2025-07-15

## 2025-07-15 RX ORDER — IPRATROPIUM BROMIDE AND ALBUTEROL SULFATE 2.5; .5 MG/3ML; MG/3ML
3 SOLUTION RESPIRATORY (INHALATION) ONCE AS NEEDED
Status: DISCONTINUED | OUTPATIENT
Start: 2025-07-15 | End: 2025-07-15 | Stop reason: HOSPADM

## 2025-07-15 RX ORDER — MIDAZOLAM HYDROCHLORIDE 1 MG/ML
INJECTION INTRAMUSCULAR; INTRAVENOUS
Status: DISCONTINUED | OUTPATIENT
Start: 2025-07-15 | End: 2025-07-15

## 2025-07-15 RX ADMIN — ONDANSETRON 4 MG: 2 INJECTION INTRAMUSCULAR; INTRAVENOUS at 11:07

## 2025-07-15 RX ADMIN — DEXAMETHASONE SODIUM PHOSPHATE 8 MG: 4 INJECTION, SOLUTION INTRA-ARTICULAR; INTRALESIONAL; INTRAMUSCULAR; INTRAVENOUS; SOFT TISSUE at 11:07

## 2025-07-15 RX ADMIN — FENTANYL CITRATE 50 MCG: 50 INJECTION, SOLUTION INTRAMUSCULAR; INTRAVENOUS at 10:07

## 2025-07-15 RX ADMIN — IPRATROPIUM BROMIDE AND ALBUTEROL SULFATE 3 ML: .5; 3 SOLUTION RESPIRATORY (INHALATION) at 10:07

## 2025-07-15 RX ADMIN — LIDOCAINE HYDROCHLORIDE 100 MG: 20 INJECTION, SOLUTION EPIDURAL; INFILTRATION; INTRACAUDAL; PERINEURAL at 10:07

## 2025-07-15 RX ADMIN — CLINDAMYCIN IN 5 PERCENT DEXTROSE 900 MG: 18 INJECTION, SOLUTION INTRAVENOUS at 10:07

## 2025-07-15 RX ADMIN — SODIUM CHLORIDE: 9 INJECTION, SOLUTION INTRAVENOUS at 08:07

## 2025-07-15 RX ADMIN — TECHNETIUM TC 99M SULFUR COLLOID KIT 0.48 MILLICURIE: KIT at 09:07

## 2025-07-15 RX ADMIN — MIDAZOLAM HYDROCHLORIDE 2 MG: 1 INJECTION, SOLUTION INTRAMUSCULAR; INTRAVENOUS at 10:07

## 2025-07-15 RX ADMIN — PROPOFOL 150 MG: 10 INJECTION, EMULSION INTRAVENOUS at 10:07

## 2025-07-15 NOTE — H&P
[facility]  History & Physical    Subjective:      Chief Complaint/Reason for Admission:  Breast cancer right    Genia Madison is a 64 y.o. female.  Patient has a invasive ductal carcinoma involving the right breast 18 mm by mammography.  Presents for hook wire guided excisional biopsy with sentinel lymph node excision.  The pros and cons been addressed including bleeding infection scarring postoperative lymphedema pain discomfort  Past Medical History:   Diagnosis Date    Abnormal chest CT     1/13/22 [facility]    Cough     bronchitis 11/2021    Diabetes mellitus, type 2     GERD (gastroesophageal reflux disease)     Hyperlipidemia     Hypertension     Hypoxia     O2  2L @ HS    Migraine     LIZET (obstructive sleep apnea)     Seizures     Wheeze      Past Surgical History:   Procedure Laterality Date    ABLATION      cardiac    BRAIN SURGERY      1/1/2001: [facility]     Social History[1]    PTA Medications   Medication Sig    ACCU-CHEK GUIDE TEST STRIPS Strp TEST BLOOD SUGAR TWICE DAILY    ACCU-CHEK SOFTCLIX LANCETS Misc 1 each by Misc.(Non-Drug; Combo Route) route 2 (two) times daily.    albuterol (PROVENTIL/VENTOLIN HFA) 90 mcg/actuation inhaler Inhale 1 puff into the lungs every 6 (six) hours as needed for Shortness of Breath.    ambrisentan (LETAIRIS) 10 MG Tab Take 10 mg by mouth.    amitriptyline (ELAVIL) 75 MG tablet Take 2 tablets (150 mg total) by mouth every evening. 75 mg: tab 2 at HS (150 mg @ HS)    aspirin (ECOTRIN) 81 MG EC tablet Take 1 tablet (81 mg total) by mouth once daily.    atorvastatin (LIPITOR) 40 MG tablet Take 40 mg by mouth.    digoxin (LANOXIN) 125 mcg tablet Take 125 mcg by mouth.    empagliflozin (JARDIANCE) 25 mg tablet Take 25 mg by mouth once daily.    ezetimibe (ZETIA) 10 mg tablet ezetimibe 10 MG Oral Tablet                      (4 sources)                   Dietary Cholesterol Absorption Inhibitor Start: 04-     fluticasone-umeclidin-vilanter (TRELEGY ELLIPTA) 200-62.5-25 mcg inhaler Inhale 1 puff into the lungs once daily.    glipiZIDE (GLUCOTROL) 2.5 MG TR24 Take 1 tablet (2.5 mg total) by mouth daily with breakfast.    losartan (COZAAR) 25 MG tablet Take 25 mg by mouth.    nebulizer and compressor Radha 1 each by Misc.(Non-Drug; Combo Route) route every 6 (six) hours as needed (wheezing, sob).    omeprazole (PRILOSEC) 40 MG capsule Take 1 capsule (40 mg total) by mouth every morning.    sildenafil (REVATIO) 20 mg Tab Take 20 mg by mouth 3 (three) times daily.    sildenafil (REVATIO) 20 mg Tab Take 20 mg by mouth 3 (three) times daily.    spironolactone (ALDACTONE) 25 MG tablet Take 25 mg by mouth once daily.    spironolactone (ALDACTONE) 50 MG tablet Take 50 mg by mouth.    torsemide (DEMADEX) 20 MG Tab Take 1 tablet (20 mg total) by mouth 2 (two) times daily before meals.    albuterol-ipratropium (DUO-NEB) 2.5 mg-0.5 mg/3 mL nebulizer solution Take 3 mLs by nebulization every 6 (six) hours as needed for Wheezing. Rescue    aspirin (ECOTRIN) 325 MG EC tablet Take 325 mg by mouth once daily.    diltiaZEM (DILACOR XR) 120 MG CDCR Take 1 capsule (120 mg total) by mouth once daily.    ferrous sulfate (IRON) 325 mg (65 mg iron) Tab tablet Take 1 tablet (325 mg total) by mouth daily with breakfast.    metFORMIN (GLUCOPHAGE) 1000 MG tablet Take 1 tablet (1,000 mg total) by mouth 2 (two) times daily with meals.    walker Misc 1 each by Misc.(Non-Drug; Combo Route) route continuous.     Review of patient's allergies indicates:   Allergen Reactions    Hydrochlorothiazide Itching    Lisinopril     Pcn [penicillins]     Nicotine Rash     Reaction to patch and gum        ROS    Objective:      Vital Signs (Most Recent)  Temp: 98.1 °F (36.7 °C) (07/15/25 0843)  Pulse: 93 (07/15/25 0843)  Resp: 18 (07/15/25 0843)  BP: 122/71 (07/15/25 0844)  SpO2: (!) 92 % (07/15/25 0843)    Vital Signs Range (Last 24H):  Temp:  [98.1 °F (36.7 °C)]    Pulse:  [93]   Resp:  [18]   BP: (122)/(71)   SpO2:  [92 %]     Physical Exam  Constitutional:       Appearance: Normal appearance.   Cardiovascular:      Rate and Rhythm: Normal rate.   Chest:   Breasts:     Right: Normal.      Left: Normal.      Comments: No definite masses associated    Hook wire in place  Musculoskeletal:      Cervical back: Normal range of motion.   Lymphadenopathy:      Upper Body:      Right upper body: No supraclavicular or axillary adenopathy.      Left upper body: No supraclavicular or axillary adenopathy.   Skin:     General: Skin is warm.   Neurological:      Mental Status: She is alert.         Data Review:  CBC:   Lab Results   Component Value Date    WBC 9.91 07/15/2025    RBC 5.45 (H) 07/15/2025    HGB 13.1 07/15/2025    HGB 10.3 (L) 10/17/2023    HCT 42.8 07/15/2025    HCT 35.2 (L) 10/17/2023     07/15/2025     BMP:   Lab Results   Component Value Date     (H) 07/15/2025     07/15/2025    K 4.9 07/15/2025     07/15/2025    CO2 26 07/15/2025    BUN 22 (H) 07/15/2025    CREATININE 1.45 (H) 07/15/2025    CALCIUM 9.4 07/15/2025      ECG: ..     Assessment:      There are no hospital problems to display for this patient.  Invasive ductal carcinoma involving the patient's right breast 7 o'clock position    Plan:    Hook wire guided excisional biopsy breast cancer     [facility] lymph node excision, utilization Lymphazurin in addition nuclear medicine           [1]   Social History  Tobacco Use    Smoking status: Every Day     Current packs/day: 0.40     Average packs/day: 0.5 packs/day for 47.5 years (23.6 ttl pk-yrs)     Types: Cigarettes     Start date: 1978     Passive exposure: Past    Smokeless tobacco: Never    Tobacco comments:     start age 26   Substance Use Topics    Alcohol use: Not Currently    Drug use: Not Currently

## 2025-07-15 NOTE — PLAN OF CARE
To nuclear med via of stretcher.   9365-Nuclear med called outpatient to inform us that surgery is ready for patient now so taking straight to holding.

## 2025-07-15 NOTE — DISCHARGE SUMMARY
[facility]  Discharge Note  Short Stay    Procedure(s) (LRB):  LUMPECTOMY, BREAST (Right)  BIOPSY, SENTINEL LYMPH NODE (Right)      OUTCOME: Patient tolerated treatment/procedure well without complication and is now ready for discharge.    DISPOSITION: Home or Self Care    FINAL DIAGNOSIS:  <principal problem not specified> breast cancer right    FOLLOWUP: In clinic    DISCHARGE INSTRUCTIONS:  No discharge procedures on file.      Clinical Reference Documents Added to Patient Instructions         Document    LUMPECTOMY DISCHARGE INSTRUCTIONS (ENGLISH)    LYMPH NODE BIOPSY (ENGLISH)            TIME SPENT ON DISCHARGE: 20 minutes

## 2025-07-15 NOTE — PROCEDURES
Lymphoscintography of right breast performed by Soto PHOENIX supervised by Dr. Gardner. Patient prepped with chlorprep and draped in a sterile manner. 1% lidocaine utilized as a local anesthetic With a 25g needle 480uCi T99m of filtered sulphur colloid infused. 3 Injections given periaereolar at 12,4, and 8 oclock. Patient tolerated procedure well with no immediate complications and imaging was began.

## 2025-07-15 NOTE — ANESTHESIA PREPROCEDURE EVALUATION
07/15/2025  Genia Madison is a 64 y.o., female.      Pre-op Assessment    I have reviewed the Patient Summary Reports.     I have reviewed the Nursing Notes. I have reviewed the NPO Status.   I have reviewed the Medications.     Review of Systems  Anesthesia Hx:  No problems with previous Anesthesia             Denies Family Hx of Anesthesia complications.    Denies Personal Hx of Anesthesia complications.                    Social:  No Alcohol Use, Smoker       Hematology/Oncology:                                  Oncology Comments:  Malignant neoplasm of lower-outer quadrant of right breast of female, estrogen receptor positive [C50.511, Z17.0]     Cardiovascular:  Exercise tolerance: good   Hypertension           hyperlipidemia   ECG has been reviewed.                            Pulmonary:   COPD, mild   Shortness of breath  Sleep Apnea PRN home O2 use               Hepatic/GI:     GERD                Neurological:    Neuromuscular Disease,  Headaches Seizures, well controlled                                Endocrine:  Diabetes, type 2           Psych:  Psychiatric History                Past Medical History:   Diagnosis Date    Abnormal chest CT     1/13/22 [facility]    Cough     bronchitis 11/2021    Diabetes mellitus, type 2     GERD (gastroesophageal reflux disease)     Hyperlipidemia     Hypertension     Hypoxia     O2  2L @ HS    Migraine     LIZET (obstructive sleep apnea)     Seizures     Wheeze      Past Surgical History:   Procedure Laterality Date    BRAIN SURGERY      1/1/2001: [facility]         Physical Exam  General: Well nourished, Cooperative and Alert    Airway:  Mallampati: II   Mouth Opening: Normal  TM Distance: Normal  Tongue: Normal  Neck ROM: Normal ROM    Dental:  Intact    Chest/Lungs:  Clear to auscultation, Normal Respiratory Rate    Heart:  Rate: Normal  Rhythm: Regular  Rhythm        Chemistry        Component Value Date/Time     12/08/2023 0844    K 3.8 12/08/2023 0844     12/08/2023 0844    CO2 28 12/08/2023 0844    BUN 28 (H) 12/08/2023 0844    CREATININE 1.45 (H) 12/08/2023 0844     (H) 12/08/2023 0844        Component Value Date/Time    CALCIUM 9.4 12/08/2023 0844    ALKPHOS 119 12/08/2023 0844    AST 36 12/08/2023 0844     (H) 12/08/2023 0844    BILITOT 0.4 12/08/2023 0844        Lab Results   Component Value Date    WBC 8.79 12/08/2023    HGB 9.6 (L) 12/08/2023    HCT 34.1 (L) 12/08/2023     12/08/2023     Results for orders placed or performed in visit on 02/14/24   EKG 12-lead    Collection Time: 02/14/24  2:51 PM   Result Value Ref Range    QRS Duration 68 ms    OHS QTC Calculation 460 ms    Narrative    Test Reason : I10,    Vent. Rate : 108 BPM     Atrial Rate : 108 BPM     P-R Int : 158 ms          QRS Dur : 068 ms      QT Int : 344 ms       P-R-T Axes : 073 104 075 degrees     QTc Int : 460 ms    Sinus tachycardia with Fusion complexes  Rightward axis  Low voltage QRS  Borderline Abnormal ECG  No previous ECGs available  Confirmed by Elissa CHOI, Esau MEADOWS ([id number]) on 2/16/2024 2:29:13 PM    Referred By: PIPER ARTEAGA           Confirmed By:Esau Bowers MD     Recent labs from Oncology are available in the EHR        Anesthesia Plan  Type of Anesthesia, risks & benefits discussed:    Anesthesia Type: Gen Supraglottic Airway  Intra-op Monitoring Plan: Standard ASA Monitors  Post Op Pain Control Plan: multimodal analgesia  Induction:  IV  Airway Plan: Direct, Post-Induction  Informed Consent: Informed consent signed with the Patient and all parties understand the risks and agree with anesthesia plan.  All questions answered. Patient consented to blood products? Yes  ASA Score: 3  Day of Surgery Review of History & Physical: H&P Update referred to the surgeon/provider.I have interviewed and examined the patient. I have reviewed the  patient's H&P dated: There are no significant changes.     Ready For Surgery From Anesthesia Perspective.     .

## 2025-07-15 NOTE — PROGRESS NOTES
IR consulted for NM lymphoscintography of the right breast. H and P reviewed. Informed consent obtained.

## 2025-07-15 NOTE — OP NOTE
[facility]  Surgery Department  Operative Note    SUMMARY     Date of Procedure: 7/15/2025     Procedure: Procedure(s) (LRB):  LUMPECTOMY, BREAST (Right)  BIOPSY, SENTINEL LYMPH NODE (Right)   Biopsy proven cancer lower outer breast lower quadrant infiltrating ductal carcinoma presents for hook wire guided excisional biopsy along with sentinel node dissection  Surgeons and Role:     * Roberta Gerber MD - Primary    Assisting Surgeon: None    Pre-Operative Diagnosis: Malignant neoplasm of lower-outer quadrant of right breast of female, estrogen receptor positive [C50.511, Z17.0]    Post-Operative Diagnosis: Post-Op Diagnosis Codes:     * Malignant neoplasm of lower-outer quadrant of right breast of female, estrogen receptor positive [C50.511, Z17.0]    Anesthesia: General/MAC    Operative Findings (including complications, if any):  Above    Description of Technical Procedures:  Taken operative suite appropriate anesthesia monitors placed patient's right chest axilla prepped draped.  We infiltrated with 1 cc of Lymphazurin blue and 3 aliquots in the subcu intradermal space in the lower outer quadrant of the right breast driving done this next made a transverse incision in the axilla dissected out the sentinel node which was identified both by the Lymphazurin blue dye and the nuclear measurements transcutaneous in vivo and ex vivo measurements confirmed by pathology.  We irrigated we inspected we assured hemostasis we closed with running 3-0 Vicryl followed by 4-0 Monocryl change gloves next addressed the breast mass infiltrated local anesthetic slightly oblique oblique incision encompassing the hook wire dissected out removed the tissue around the tip of the wire.  X-ray confirmed we had our specimen.  We irrigated subcutaneous tissues assured hemostasis with cautery closed with interrupted 3-0 chromic followed by running 4-0 Monocryl sterile dressing applied         Significant Surgical  Tasks Conducted by the Assistant(s), if Applicable:  Skin closure    Estimated Blood Loss (EBL): * No values recorded between 7/15/2025 10:35 AM and 7/15/2025 12:05 PM *20cc           Implants: * No implants in log *    Specimens:   Specimen (24h ago, onward)       Start     Ordered    07/15/25 1115  Surgical Pathology  RELEASE UPON ORDERING         07/15/25 1115                   ID Type Source Tests Collected by Time Destination   1 : Asentinel node Tissue Axilla, Right SURGICAL PATHOLOGY Roberta Gerber MD 7/15/2025 1120    2 : B. right breast mass Tissue Breast, Right SURGICAL PATHOLOGY Roberta Gerber MD 7/15/2025 1129               Condition: Good    Disposition: PACU - hemodynamically stable.    Attestation: Op Note Attestation: I performed the procedure.

## 2025-07-15 NOTE — ANESTHESIA PROCEDURE NOTES
Intubation    Date/Time: 7/15/2025 10:41 AM    Performed by: Milka Coley CRNA  Authorized by: Torres Varghese MD    Intubation:     Induction:  Intravenous    Intubated:  Postinduction    Mask Ventilation:  Easy mask    Attempts:  1    Attempted By:  CRNA    Difficult Airway Encountered?: No      Complications:  None    Airway Device:  Supraglottic airway/LMA    Airway Device Size:  4.0    Placement Verified By:  Capnometry    Complicating Factors:  None    Findings Post-Intubation:  BS equal bilateral and atraumatic/condition of teeth unchanged

## 2025-07-15 NOTE — TRANSFER OF CARE
"Anesthesia Transfer of Care Note    Patient: Genia Madison    Procedure(s) Performed: Procedure(s) (LRB):  LUMPECTOMY, BREAST (Right)  BIOPSY, SENTINEL LYMPH NODE (Right)    Patient location: GI    Anesthesia Type: general    Transport from OR: Transported from OR on 6-10 L/min O2 by face mask with adequate spontaneous ventilation    Post pain: adequate analgesia    Post assessment: no apparent anesthetic complications and tolerated procedure well    Post vital signs: stable    Level of consciousness: responds to stimulation and sedated    Nausea/Vomiting: no nausea/vomiting    Complications: none    Transfer of care protocol was followed    Last vitals: Visit Vitals  BP (!) 144/84 (BP Location: Left arm, Patient Position: Lying)   Pulse 88   Temp 36.4 °C (97.6 °F) (Oral)   Resp 16   Ht 5' 5" (1.651 m)   Wt 84.4 kg (186 lb)   LMP  (LMP Unknown)   SpO2 95%   Breastfeeding No   BMI 30.95 kg/m²     "

## 2025-07-17 LAB
DHEA SERPL-MCNC: NORMAL
ESTROGEN SERPL-MCNC: NORMAL PG/ML
INSULIN SERPL-ACNC: NORMAL U[IU]/ML
LAB AP GROSS DESCRIPTION: NORMAL
LAB AP LABORATORY NOTES: NORMAL
LAB AP SYNOPTIC CHECKLIST: NORMAL
T3RU NFR SERPL: NORMAL %

## 2025-07-18 NOTE — ANESTHESIA POSTPROCEDURE EVALUATION
Anesthesia Post Evaluation    Patient: Genia Madison    Procedure(s) Performed: Procedure(s) (LRB):  LUMPECTOMY, BREAST (Right)  BIOPSY, SENTINEL LYMPH NODE (Right)    Final Anesthesia Type: general      Patient location during evaluation: PACU  Patient participation: Yes- Able to Participate  Level of consciousness: awake and alert  Post-procedure vital signs: reviewed and stable  Pain management: adequate  Airway patency: patent  LIZET mitigation strategies: Multimodal analgesia  PONV status at discharge: No PONV  Anesthetic complications: no      Cardiovascular status: blood pressure returned to baseline  Respiratory status: unassisted  Hydration status: euvolemic  Follow-up not needed.              Vitals Value Taken Time   /77 07/15/25 14:16   Temp 36.4 °C (97.6 °F) 07/15/25 12:03   Pulse 83 07/15/25 14:24   Resp 16 07/15/25 14:15   SpO2 93 % 07/15/25 14:24   Vitals shown include unfiled device data.      Event Time   Out of Recovery 12:30:00         Pain/Hardik Score: No data recorded

## 2025-07-31 ENCOUNTER — OFFICE VISIT (OUTPATIENT)
Dept: VASCULAR SURGERY | Facility: CLINIC | Age: 65
End: 2025-07-31
Payer: MEDICARE

## 2025-07-31 VITALS — BODY MASS INDEX: 31 KG/M2 | WEIGHT: 186.06 LBS | HEIGHT: 65 IN

## 2025-07-31 DIAGNOSIS — C50.511 MALIGNANT NEOPLASM OF LOWER-OUTER QUADRANT OF RIGHT BREAST OF FEMALE, ESTROGEN RECEPTOR POSITIVE: Primary | ICD-10-CM

## 2025-07-31 DIAGNOSIS — Z17.0 MALIGNANT NEOPLASM OF LOWER-OUTER QUADRANT OF RIGHT BREAST OF FEMALE, ESTROGEN RECEPTOR POSITIVE: Primary | ICD-10-CM

## 2025-07-31 PROCEDURE — 1159F MED LIST DOCD IN RCRD: CPT | Mod: CPTII,,, | Performed by: SURGERY

## 2025-07-31 PROCEDURE — 3051F HG A1C>EQUAL 7.0%<8.0%: CPT | Mod: CPTII,,, | Performed by: SURGERY

## 2025-07-31 PROCEDURE — 4010F ACE/ARB THERAPY RXD/TAKEN: CPT | Mod: CPTII,,, | Performed by: SURGERY

## 2025-07-31 PROCEDURE — 1160F RVW MEDS BY RX/DR IN RCRD: CPT | Mod: CPTII,,, | Performed by: SURGERY

## 2025-07-31 PROCEDURE — 99215 OFFICE O/P EST HI 40 MIN: CPT | Mod: PBBFAC | Performed by: SURGERY

## 2025-07-31 PROCEDURE — 99024 POSTOP FOLLOW-UP VISIT: CPT | Mod: ,,, | Performed by: SURGERY

## 2025-07-31 PROCEDURE — 99999 PR PBB SHADOW E&M-EST. PATIENT-LVL V: CPT | Mod: PBBFAC,,, | Performed by: SURGERY

## 2025-07-31 NOTE — PROGRESS NOTES
Surgery clinic     Segmental mastectomy with a 2-0 sentinel lymph nodes negative for metastatic disease.      Grade 1 invasive breast cancer with negative margins    All incisions healing appropriately     Keep appointment with Dr. Edwards oncologist     Follow up me in 6 months.    She understands she will require radiation therapy to the remaining breast

## (undated) DEVICE — PROBE TRUNODE GAMMA HAND PIECE

## (undated) DEVICE — STRIP MEDI WND CLSR 1X5IN

## (undated) DEVICE — APPLIER LIGACLIP MED 11IN

## (undated) DEVICE — SUT GUT CHROMIC 3-0 SH 36IN

## (undated) DEVICE — SUT MONOCYRL 4-0 PS2 UND

## (undated) DEVICE — GOWN NONREINF SET-IN SLV 2XL

## (undated) DEVICE — SUT VICRYL 3-0 27 SH

## (undated) DEVICE — APPLIER LIGACLIP SM 9.38IN

## (undated) DEVICE — ADHESIVE MASTISOL VIAL 48/BX

## (undated) DEVICE — GLOVE SURG BIOGEL LATEX SZ 7.5

## (undated) DEVICE — GLOVE SENSICARE PI SURG 6.5

## (undated) DEVICE — DRESSING TRANS 4X4 TEGADERM

## (undated) DEVICE — ELECTRODE NDL EDGE 2 5/6IN

## (undated) DEVICE — KIT BASIC RUSH

## (undated) DEVICE — GLOVE SENSICARE PI GRN 7